# Patient Record
Sex: FEMALE | Race: BLACK OR AFRICAN AMERICAN | Employment: STUDENT | URBAN - METROPOLITAN AREA
[De-identification: names, ages, dates, MRNs, and addresses within clinical notes are randomized per-mention and may not be internally consistent; named-entity substitution may affect disease eponyms.]

---

## 2017-01-02 ENCOUNTER — ANESTHESIA (OUTPATIENT)
Dept: SURGERY | Age: 22
End: 2017-01-02
Payer: COMMERCIAL

## 2017-01-02 ENCOUNTER — HOSPITAL ENCOUNTER (EMERGENCY)
Age: 22
Discharge: HOME | End: 2017-01-02
Attending: EMERGENCY MEDICINE | Admitting: SURGERY
Payer: COMMERCIAL

## 2017-01-02 ENCOUNTER — ANESTHESIA EVENT (OUTPATIENT)
Dept: SURGERY | Age: 22
End: 2017-01-02
Payer: COMMERCIAL

## 2017-01-02 VITALS
OXYGEN SATURATION: 97 % | HEIGHT: 68 IN | BODY MASS INDEX: 35.92 KG/M2 | SYSTOLIC BLOOD PRESSURE: 140 MMHG | RESPIRATION RATE: 15 BRPM | TEMPERATURE: 98.2 F | WEIGHT: 237 LBS | DIASTOLIC BLOOD PRESSURE: 79 MMHG | HEART RATE: 74 BPM

## 2017-01-02 DIAGNOSIS — Z22.322 MRSA (METHICILLIN RESISTANT STAPH AUREUS) CULTURE POSITIVE: Primary | ICD-10-CM

## 2017-01-02 PROBLEM — L02.419 AXILLARY ABSCESS: Status: ACTIVE | Noted: 2017-01-02

## 2017-01-02 LAB
ANION GAP BLD CALC-SCNC: 14 MMOL/L (ref 5–15)
BUN BLD-MCNC: 9 MG/DL (ref 9–20)
CA-I BLD-MCNC: 1.13 MMOL/L (ref 1.12–1.32)
CHLORIDE BLD-SCNC: 105 MMOL/L (ref 98–107)
CO2 BLD-SCNC: 27 MMOL/L (ref 21–32)
CREAT BLD-MCNC: 0.6 MG/DL (ref 0.6–1.3)
GLUCOSE BLD-MCNC: 93 MG/DL (ref 65–105)
HCT VFR BLD CALC: 39 % (ref 35–47)
HGB BLD-MCNC: 13.3 GM/DL (ref 11.5–16)
POTASSIUM BLD-SCNC: 5.4 MMOL/L (ref 3.5–5.1)
SERVICE CMNT-IMP: ABNORMAL
SODIUM BLD-SCNC: 140 MMOL/L (ref 136–145)

## 2017-01-02 PROCEDURE — 77030008684 HC TU ET CUF COVD -B: Performed by: ANESTHESIOLOGY

## 2017-01-02 PROCEDURE — 76210000021 HC REC RM PH II 0.5 TO 1 HR: Performed by: SURGERY

## 2017-01-02 PROCEDURE — 77030026438 HC STYL ET INTUB CARD -A: Performed by: ANESTHESIOLOGY

## 2017-01-02 PROCEDURE — 74011250636 HC RX REV CODE- 250/636

## 2017-01-02 PROCEDURE — 87077 CULTURE AEROBIC IDENTIFY: CPT | Performed by: SURGERY

## 2017-01-02 PROCEDURE — 74011250636 HC RX REV CODE- 250/636: Performed by: EMERGENCY MEDICINE

## 2017-01-02 PROCEDURE — 77030018836 HC SOL IRR NACL ICUM -A: Performed by: SURGERY

## 2017-01-02 PROCEDURE — 74011250637 HC RX REV CODE- 250/637: Performed by: ANESTHESIOLOGY

## 2017-01-02 PROCEDURE — 80047 BASIC METABLC PNL IONIZED CA: CPT

## 2017-01-02 PROCEDURE — 96376 TX/PRO/DX INJ SAME DRUG ADON: CPT

## 2017-01-02 PROCEDURE — 76010000138 HC OR TIME 0.5 TO 1 HR: Performed by: SURGERY

## 2017-01-02 PROCEDURE — 99284 EMERGENCY DEPT VISIT MOD MDM: CPT

## 2017-01-02 PROCEDURE — 74011250636 HC RX REV CODE- 250/636: Performed by: ANESTHESIOLOGY

## 2017-01-02 PROCEDURE — 87186 SC STD MICRODIL/AGAR DIL: CPT | Performed by: SURGERY

## 2017-01-02 PROCEDURE — 77030011640 HC PAD GRND REM COVD -A: Performed by: SURGERY

## 2017-01-02 PROCEDURE — 87205 SMEAR GRAM STAIN: CPT | Performed by: SURGERY

## 2017-01-02 PROCEDURE — 76210000006 HC OR PH I REC 0.5 TO 1 HR: Performed by: SURGERY

## 2017-01-02 PROCEDURE — 76060000032 HC ANESTHESIA 0.5 TO 1 HR: Performed by: SURGERY

## 2017-01-02 PROCEDURE — 74011000250 HC RX REV CODE- 250

## 2017-01-02 PROCEDURE — 96374 THER/PROPH/DIAG INJ IV PUSH: CPT

## 2017-01-02 PROCEDURE — 96375 TX/PRO/DX INJ NEW DRUG ADDON: CPT

## 2017-01-02 PROCEDURE — 87075 CULTR BACTERIA EXCEPT BLOOD: CPT | Performed by: SURGERY

## 2017-01-02 RX ORDER — FENTANYL CITRATE 50 UG/ML
50 INJECTION, SOLUTION INTRAMUSCULAR; INTRAVENOUS AS NEEDED
Status: DISCONTINUED | OUTPATIENT
Start: 2017-01-02 | End: 2017-01-02 | Stop reason: HOSPADM

## 2017-01-02 RX ORDER — ONDANSETRON 2 MG/ML
INJECTION INTRAMUSCULAR; INTRAVENOUS AS NEEDED
Status: DISCONTINUED | OUTPATIENT
Start: 2017-01-02 | End: 2017-01-02 | Stop reason: HOSPADM

## 2017-01-02 RX ORDER — LIDOCAINE HYDROCHLORIDE 20 MG/ML
INJECTION, SOLUTION EPIDURAL; INFILTRATION; INTRACAUDAL; PERINEURAL AS NEEDED
Status: DISCONTINUED | OUTPATIENT
Start: 2017-01-02 | End: 2017-01-02 | Stop reason: HOSPADM

## 2017-01-02 RX ORDER — FENTANYL CITRATE 50 UG/ML
INJECTION, SOLUTION INTRAMUSCULAR; INTRAVENOUS AS NEEDED
Status: DISCONTINUED | OUTPATIENT
Start: 2017-01-02 | End: 2017-01-02 | Stop reason: HOSPADM

## 2017-01-02 RX ORDER — MIDAZOLAM HYDROCHLORIDE 1 MG/ML
1 INJECTION, SOLUTION INTRAMUSCULAR; INTRAVENOUS AS NEEDED
Status: DISCONTINUED | OUTPATIENT
Start: 2017-01-02 | End: 2017-01-02 | Stop reason: HOSPADM

## 2017-01-02 RX ORDER — MIDAZOLAM HYDROCHLORIDE 1 MG/ML
0.5 INJECTION, SOLUTION INTRAMUSCULAR; INTRAVENOUS
Status: DISCONTINUED | OUTPATIENT
Start: 2017-01-02 | End: 2017-01-03 | Stop reason: HOSPADM

## 2017-01-02 RX ORDER — SODIUM CHLORIDE, SODIUM LACTATE, POTASSIUM CHLORIDE, CALCIUM CHLORIDE 600; 310; 30; 20 MG/100ML; MG/100ML; MG/100ML; MG/100ML
125 INJECTION, SOLUTION INTRAVENOUS CONTINUOUS
Status: DISCONTINUED | OUTPATIENT
Start: 2017-01-02 | End: 2017-01-02 | Stop reason: HOSPADM

## 2017-01-02 RX ORDER — CABERGOLINE 0.5 MG/1
0.25 TABLET ORAL 2 TIMES WEEKLY
Status: ON HOLD | COMMUNITY
End: 2020-10-28

## 2017-01-02 RX ORDER — MIDAZOLAM HYDROCHLORIDE 1 MG/ML
INJECTION, SOLUTION INTRAMUSCULAR; INTRAVENOUS AS NEEDED
Status: DISCONTINUED | OUTPATIENT
Start: 2017-01-02 | End: 2017-01-02 | Stop reason: HOSPADM

## 2017-01-02 RX ORDER — SODIUM CHLORIDE 9 MG/ML
25 INJECTION, SOLUTION INTRAVENOUS CONTINUOUS
Status: DISCONTINUED | OUTPATIENT
Start: 2017-01-02 | End: 2017-01-03 | Stop reason: HOSPADM

## 2017-01-02 RX ORDER — HYDROCODONE BITARTRATE AND ACETAMINOPHEN 5; 325 MG/1; MG/1
1 TABLET ORAL
Qty: 30 TAB | Refills: 0 | Status: SHIPPED | OUTPATIENT
Start: 2017-01-02 | End: 2017-01-06

## 2017-01-02 RX ORDER — HYDROMORPHONE HYDROCHLORIDE 1 MG/ML
0.2 INJECTION, SOLUTION INTRAMUSCULAR; INTRAVENOUS; SUBCUTANEOUS
Status: DISCONTINUED | OUTPATIENT
Start: 2017-01-02 | End: 2017-01-03 | Stop reason: HOSPADM

## 2017-01-02 RX ORDER — HYDROCODONE BITARTRATE AND ACETAMINOPHEN 5; 325 MG/1; MG/1
1 TABLET ORAL AS NEEDED
Status: DISCONTINUED | OUTPATIENT
Start: 2017-01-02 | End: 2017-01-03 | Stop reason: HOSPADM

## 2017-01-02 RX ORDER — KETOROLAC TROMETHAMINE 30 MG/ML
INJECTION, SOLUTION INTRAMUSCULAR; INTRAVENOUS AS NEEDED
Status: DISCONTINUED | OUTPATIENT
Start: 2017-01-02 | End: 2017-01-02 | Stop reason: HOSPADM

## 2017-01-02 RX ORDER — MORPHINE SULFATE 10 MG/ML
2 INJECTION, SOLUTION INTRAMUSCULAR; INTRAVENOUS
Status: DISCONTINUED | OUTPATIENT
Start: 2017-01-02 | End: 2017-01-03 | Stop reason: HOSPADM

## 2017-01-02 RX ORDER — SUCCINYLCHOLINE CHLORIDE 20 MG/ML
INJECTION INTRAMUSCULAR; INTRAVENOUS AS NEEDED
Status: DISCONTINUED | OUTPATIENT
Start: 2017-01-02 | End: 2017-01-02 | Stop reason: HOSPADM

## 2017-01-02 RX ORDER — DEXAMETHASONE SODIUM PHOSPHATE 4 MG/ML
INJECTION, SOLUTION INTRA-ARTICULAR; INTRALESIONAL; INTRAMUSCULAR; INTRAVENOUS; SOFT TISSUE AS NEEDED
Status: DISCONTINUED | OUTPATIENT
Start: 2017-01-02 | End: 2017-01-02 | Stop reason: HOSPADM

## 2017-01-02 RX ORDER — SODIUM CHLORIDE 0.9 % (FLUSH) 0.9 %
5-10 SYRINGE (ML) INJECTION EVERY 8 HOURS
Status: DISCONTINUED | OUTPATIENT
Start: 2017-01-02 | End: 2017-01-02 | Stop reason: HOSPADM

## 2017-01-02 RX ORDER — NAPROXEN 500 MG/1
500 TABLET ORAL 2 TIMES DAILY WITH MEALS
Qty: 20 TAB | Refills: 0 | Status: SHIPPED | OUTPATIENT
Start: 2017-01-02 | End: 2017-01-07

## 2017-01-02 RX ORDER — DIPHENHYDRAMINE HYDROCHLORIDE 50 MG/ML
12.5 INJECTION, SOLUTION INTRAMUSCULAR; INTRAVENOUS AS NEEDED
Status: DISCONTINUED | OUTPATIENT
Start: 2017-01-02 | End: 2017-01-03 | Stop reason: HOSPADM

## 2017-01-02 RX ORDER — HYDROMORPHONE HYDROCHLORIDE 1 MG/ML
1 INJECTION, SOLUTION INTRAMUSCULAR; INTRAVENOUS; SUBCUTANEOUS
Status: COMPLETED | OUTPATIENT
Start: 2017-01-02 | End: 2017-01-02

## 2017-01-02 RX ORDER — ONDANSETRON 2 MG/ML
4 INJECTION INTRAMUSCULAR; INTRAVENOUS AS NEEDED
Status: DISCONTINUED | OUTPATIENT
Start: 2017-01-02 | End: 2017-01-03 | Stop reason: HOSPADM

## 2017-01-02 RX ORDER — FENTANYL CITRATE 50 UG/ML
25 INJECTION, SOLUTION INTRAMUSCULAR; INTRAVENOUS
Status: DISCONTINUED | OUTPATIENT
Start: 2017-01-02 | End: 2017-01-03 | Stop reason: HOSPADM

## 2017-01-02 RX ORDER — SODIUM CHLORIDE 0.9 % (FLUSH) 0.9 %
5-10 SYRINGE (ML) INJECTION AS NEEDED
Status: DISCONTINUED | OUTPATIENT
Start: 2017-01-02 | End: 2017-01-03 | Stop reason: HOSPADM

## 2017-01-02 RX ORDER — LIDOCAINE HYDROCHLORIDE 10 MG/ML
0.1 INJECTION, SOLUTION EPIDURAL; INFILTRATION; INTRACAUDAL; PERINEURAL AS NEEDED
Status: DISCONTINUED | OUTPATIENT
Start: 2017-01-02 | End: 2017-01-02 | Stop reason: HOSPADM

## 2017-01-02 RX ORDER — SODIUM CHLORIDE 0.9 % (FLUSH) 0.9 %
5-10 SYRINGE (ML) INJECTION AS NEEDED
Status: DISCONTINUED | OUTPATIENT
Start: 2017-01-02 | End: 2017-01-02 | Stop reason: HOSPADM

## 2017-01-02 RX ORDER — SODIUM CHLORIDE, SODIUM LACTATE, POTASSIUM CHLORIDE, CALCIUM CHLORIDE 600; 310; 30; 20 MG/100ML; MG/100ML; MG/100ML; MG/100ML
75 INJECTION, SOLUTION INTRAVENOUS CONTINUOUS
Status: DISCONTINUED | OUTPATIENT
Start: 2017-01-02 | End: 2017-01-03 | Stop reason: HOSPADM

## 2017-01-02 RX ORDER — SODIUM CHLORIDE, SODIUM LACTATE, POTASSIUM CHLORIDE, CALCIUM CHLORIDE 600; 310; 30; 20 MG/100ML; MG/100ML; MG/100ML; MG/100ML
INJECTION, SOLUTION INTRAVENOUS
Status: DISCONTINUED | OUTPATIENT
Start: 2017-01-02 | End: 2017-01-02 | Stop reason: HOSPADM

## 2017-01-02 RX ORDER — ROCURONIUM BROMIDE 10 MG/ML
INJECTION, SOLUTION INTRAVENOUS AS NEEDED
Status: DISCONTINUED | OUTPATIENT
Start: 2017-01-02 | End: 2017-01-02 | Stop reason: HOSPADM

## 2017-01-02 RX ORDER — SODIUM CHLORIDE 9 MG/ML
50 INJECTION, SOLUTION INTRAVENOUS CONTINUOUS
Status: DISCONTINUED | OUTPATIENT
Start: 2017-01-02 | End: 2017-01-02 | Stop reason: HOSPADM

## 2017-01-02 RX ORDER — ONDANSETRON 2 MG/ML
4 INJECTION INTRAMUSCULAR; INTRAVENOUS
Status: COMPLETED | OUTPATIENT
Start: 2017-01-02 | End: 2017-01-02

## 2017-01-02 RX ORDER — HYDROMORPHONE HYDROCHLORIDE 1 MG/ML
1 INJECTION, SOLUTION INTRAMUSCULAR; INTRAVENOUS; SUBCUTANEOUS
Status: DISCONTINUED | OUTPATIENT
Start: 2017-01-02 | End: 2017-01-03 | Stop reason: HOSPADM

## 2017-01-02 RX ORDER — HYDROMORPHONE HYDROCHLORIDE 1 MG/ML
INJECTION, SOLUTION INTRAMUSCULAR; INTRAVENOUS; SUBCUTANEOUS AS NEEDED
Status: DISCONTINUED | OUTPATIENT
Start: 2017-01-02 | End: 2017-01-02 | Stop reason: HOSPADM

## 2017-01-02 RX ORDER — PROPOFOL 10 MG/ML
INJECTION, EMULSION INTRAVENOUS AS NEEDED
Status: DISCONTINUED | OUTPATIENT
Start: 2017-01-02 | End: 2017-01-02 | Stop reason: HOSPADM

## 2017-01-02 RX ADMIN — HYDROMORPHONE HYDROCHLORIDE 1 MG: 1 INJECTION, SOLUTION INTRAMUSCULAR; INTRAVENOUS; SUBCUTANEOUS at 12:07

## 2017-01-02 RX ADMIN — SODIUM CHLORIDE, SODIUM LACTATE, POTASSIUM CHLORIDE, CALCIUM CHLORIDE: 600; 310; 30; 20 INJECTION, SOLUTION INTRAVENOUS at 21:33

## 2017-01-02 RX ADMIN — FENTANYL CITRATE 50 MCG: 50 INJECTION, SOLUTION INTRAMUSCULAR; INTRAVENOUS at 21:38

## 2017-01-02 RX ADMIN — DEXAMETHASONE SODIUM PHOSPHATE 8 MG: 4 INJECTION, SOLUTION INTRA-ARTICULAR; INTRALESIONAL; INTRAMUSCULAR; INTRAVENOUS; SOFT TISSUE at 21:47

## 2017-01-02 RX ADMIN — ONDANSETRON 4 MG: 2 INJECTION INTRAMUSCULAR; INTRAVENOUS at 12:07

## 2017-01-02 RX ADMIN — SUCCINYLCHOLINE CHLORIDE 140 MG: 20 INJECTION INTRAMUSCULAR; INTRAVENOUS at 21:38

## 2017-01-02 RX ADMIN — MIDAZOLAM HYDROCHLORIDE 3 MG: 1 INJECTION, SOLUTION INTRAMUSCULAR; INTRAVENOUS at 21:37

## 2017-01-02 RX ADMIN — FENTANYL CITRATE 50 MCG: 50 INJECTION, SOLUTION INTRAMUSCULAR; INTRAVENOUS at 21:55

## 2017-01-02 RX ADMIN — KETOROLAC TROMETHAMINE 30 MG: 30 INJECTION, SOLUTION INTRAMUSCULAR; INTRAVENOUS at 22:16

## 2017-01-02 RX ADMIN — HYDROMORPHONE HYDROCHLORIDE 1 MG: 1 INJECTION, SOLUTION INTRAMUSCULAR; INTRAVENOUS; SUBCUTANEOUS at 15:30

## 2017-01-02 RX ADMIN — ONDANSETRON 4 MG: 2 INJECTION INTRAMUSCULAR; INTRAVENOUS at 22:49

## 2017-01-02 RX ADMIN — PROPOFOL 200 MG: 10 INJECTION, EMULSION INTRAVENOUS at 21:38

## 2017-01-02 RX ADMIN — HYDROMORPHONE HYDROCHLORIDE 0.25 MG: 1 INJECTION, SOLUTION INTRAMUSCULAR; INTRAVENOUS; SUBCUTANEOUS at 21:50

## 2017-01-02 RX ADMIN — HYDROMORPHONE HYDROCHLORIDE 1 MG: 1 INJECTION, SOLUTION INTRAMUSCULAR; INTRAVENOUS; SUBCUTANEOUS at 19:38

## 2017-01-02 RX ADMIN — ONDANSETRON 4 MG: 2 INJECTION INTRAMUSCULAR; INTRAVENOUS at 21:47

## 2017-01-02 RX ADMIN — HYDROCODONE BITARTRATE AND ACETAMINOPHEN 1 TABLET: 5; 325 TABLET ORAL at 22:49

## 2017-01-02 RX ADMIN — ROCURONIUM BROMIDE 5 MG: 10 INJECTION, SOLUTION INTRAVENOUS at 21:38

## 2017-01-02 RX ADMIN — LIDOCAINE HYDROCHLORIDE 60 MG: 20 INJECTION, SOLUTION EPIDURAL; INFILTRATION; INTRACAUDAL; PERINEURAL at 21:38

## 2017-01-02 RX ADMIN — MIDAZOLAM HYDROCHLORIDE 2 MG: 1 INJECTION, SOLUTION INTRAMUSCULAR; INTRAVENOUS at 21:33

## 2017-01-02 NOTE — IP AVS SNAPSHOT
[facility] [address] [place] [address] 
[phone] Patient: Heber Cotter MRN: [id number] AEW:[id number] You are allergic to the following No active allergies Recent Documentation Height Weight BMI OB Status Smoking Status 1.727 m 107.5 kg 36.04 kg/m2 Having regular periods Passive Smoke Exposure - Never Smoker Unresulted Labs Order Current Status CULTURE, ANAEROBIC In process CULTURE, WOUND W GRAM STAIN In process Emergency Contacts Name Discharge Info Relation Home Work Mobile Monik Forde  Parent [1] [phone] About your hospitalization You were admitted on:  N/A You last received care in the:  [facility] PACU You were discharged on:  January 2, 2017 Unit phone number:  [phone] Why you were hospitalized Your primary diagnosis was:  Axillary Abscess Providers Seen During Your Hospitalizations Provider Role Specialty Primary office phone Gera Arevalo MD Attending Provider Emergency Medicine [phone] Shasta Kelley MD Attending Provider General Surgery [phone] Your Primary Care Physician (PCP) Primary Care Physician Office Phone Office Fax Viviana Ramos [phone] Follow-up Information Follow up With Details Comments Contact Info Diana Pina, [address] [place] [address] 
[address] [employer] [place] [address] 
[phone] Current Discharge Medication List  
  
START taking these medications Dose & Instructions Dispensing Information Comments Morning Noon Evening Bedtime HYDROcodone-acetaminophen 5-325 mg per tablet Commonly known as:  Caity Gilliam Your next dose is: Today, Tomorrow Other:  _________ Dose:  1 Tab Take 1 Tab by mouth every six (6) hours as needed for Pain (may take two tabs if pain is severe). Max Daily Amount: 4 Tabs. Quantity:  30 Tab Refills:  0  
     
   
   
   
  
 naproxen 500 mg tablet Commonly known as:  NAPROSYN Your next dose is: Today, Tomorrow Other:  _________ Dose:  500 mg Take 1 Tab by mouth two (2) times daily (with meals) for 5 days. Quantity:  20 Tab Refills:  0 CONTINUE these medications which have NOT CHANGED Dose & Instructions Dispensing Information Comments Morning Noon Evening Bedtime  
 cabergoline 0.5 mg tablet Commonly known as:  DOSTINEX Your next dose is: Today, Tomorrow Other:  _________ Dose:  0.25 mg Take 0.25 mg by mouth two (2) times a week. Refills:  0 Where to Get Your Medications Information on where to get these meds will be given to you by the nurse or doctor. ! Ask your nurse or doctor about these medications HYDROcodone-acetaminophen 5-325 mg per tablet  
 naproxen 500 mg tablet Discharge Instructions Patient Discharge Instructions Luisa Chávez / [id number] : 1995 Admitted 2017 Discharged: 2017 · It is important that you take the medication exactly as they are prescribed. · Keep your medication in the bottles provided by the pharmacist and keep a list of the medication names, dosages, and times to be taken in your wallet. · Do not take other medications without consulting your doctor. Take naproxen (Anaprox, Naprosyn, Naprelan) twice daily as scheduled (do not wait for pain) then combine with hydrocodone/acetaminophen (Lorcet, Lortab, Maxidone, Norco, Vicodin, Xodol, Zydone) as directed for severe pain. What to do at [facility] Recommended diet: Regular Diet Recommended activity: no heavy lifting or strenuous activity with left arm If you experience any of the following symptoms below, call Dr. Pham Esposito office [phone]. Follow-up with Dr. Wilma Peña in 1 week(s). Call the office to schedule your appointment. Contact the [address] to schedule wound care. [phone] Information obtained by : 
I understand that if any problems occur once I am at home I am to contact my physician. I understand and acknowledge receipt of the instructions indicated above. Physician's or R.N.'s Signature                                                                  Date/Time Patient or Representative Signature                                                          Date/Time Wound Care: After Your Visit Your Care Instructions Taking good care of your wound at home will help it heal quickly and reduce your chance of infection. Follow-up care is a key part of your treatment and safety. Be sure to make and go to all appointments, and call your doctor if you are having problems. It's also a good idea to know your test results and keep a list of the medicines you take. How can you care for yourself at home? · Remove the dressings then clean the area with soap and water once daily (in shower is okay). It's okay to get the inside of the wound wet in the shower. Other cleaning products, such as hydrogen peroxide, can delay wound healing. ¨ Pack wound with saline-moistened gauze. ¨ Cover with dry gauze and ABD pad. · Take pain medicines exactly as directed. Some pain is normal with a wound, but do not ignore pain that is getting worse instead of better. You could have an infection. ¨ If the doctor gave you a prescription medicine for pain, take it as prescribed. ¨ If you are not taking a prescription pain medicine, ask your doctor if you can take an over-the-counter medicine. ¨ Do not take two or more pain medicines at the same time unless the doctor told you to. Many pain medicines have acetaminophen, which is Tylenol. Too much acetaminophen can be harmful. ¨ Do not give aspirin to anyone younger than 20. It has been linked to Reye syndrome, a serious illness. When should you call for help? Call your doctor now or seek immediate medical care if: 
· You have signs of infection, such as: 
¨ Increased pain, swelling, warmth, or redness around the wound. ¨ Red streaks leading from the wound. ¨ Pus draining from the wound. ¨ Swollen lymph nodes in your neck, armpits, or groin. ¨ A fever. · The wound starts to bleed, and blood soaks through the bandage. Oozing small amounts of blood is normal. 
Watch closely for changes in your health, and be sure to contact your doctor if: · The wound is not getting better each day. Where can you learn more? Go to [employer].be. Enter Z729 in the search box to learn more about \"Wound Care: After Your Visit. \"  
© 8364-8736 Healthwise, Incorporated. Care instructions adapted under license by Romayne Duster (which disclaims liability or warranty for this information). This care instruction is for use with your licensed healthcare professional. If you have questions about a medical condition or this instruction, always ask your healthcare professional. Irena Fee any warranty or liability for your use of this information. Discharge Orders None Introducing [address]! Romayne Duster introduces [employer] patient portal. Now you can access parts of your medical record, email your doctor's office, and request medication refills online. 1. In your internet browser, go to [email or web address] 2. Click on the First Time User? Click Here link in the Sign In box.  You will see the New Member Sign Up page. 3. Enter your Thinkspeed Access Code exactly as it appears below. You will not need to use this code after youve completed the sign-up process. If you do not sign up before the expiration date, you must request a new code. · Thinkspeed Access Code: [email or web address] Expires: 4/2/2017  8:45 AM 
 
4. Enter the last four digits of your Social Security Number (xxxx) and Date of Birth (mm/dd/yyyy) as indicated and click Submit. You will be taken to the next sign-up page. 5. Create a Thinkspeed ID. This will be your Thinkspeed login ID and cannot be changed, so think of one that is secure and easy to remember. 6. Create a Thinkspeed password. You can change your password at any time. 7. Enter your Password Reset Question and Answer. This can be used at a later time if you forget your password. 8. Enter your e-mail address. You will receive e-mail notification when new information is available in [address] 9. Click Sign Up. You can now view and download portions of your medical record. 10. Click the Download Summary menu link to download a portable copy of your medical information. If you have questions, please visit the Frequently Asked Questions section of the Thinkspeed website. Remember, Thinkspeed is NOT to be used for urgent needs. For medical emergencies, dial 911. Now available from your iPhone and Android! General Information Please provide this summary of care documentation to your next provider. Patient Signature:  ____________________________________________________________ Date:  ____________________________________________________________  
  
[employer] Provider Signature:  ____________________________________________________________ Date:  ____________________________________________________________

## 2017-01-02 NOTE — CONSULTS
Surgery Consultation    History of Present Illness:      Clinton Holt is a 24 y.o. female who presents with a boil at left underarm. First noticed about 1.5 weeks ago and has grown in size since then. She recalls having similar lesion on her back but this is first episode in axilla. Pt is referred by Lucero Gonzalez MD.    Consultation was requested for assistance with evaluation of axillary abscess. Past Medical History   Diagnosis Date    Acne      back    Allergy, unspecified not elsewhere classified 2006     Txd with Immunotherapy. Dr. Papi Glass     Past Surgical History   Procedure Laterality Date    Hx tympanostomy  1999     Rt      Family History   Problem Relation Age of Onset    Asthma Mother     High Cholesterol Mother     Diabetes Paternal Grandmother     Arthritis-osteo Maternal Grandmother      Social History     Social History    Marital status: SINGLE     Spouse name: N/A    Number of children: N/A    Years of education: N/A     Social History Main Topics    Smoking status: Passive Smoke Exposure - Never Smoker    Smokeless tobacco: Never Used      Comment: lived with smoker dad x 3 yrs    Alcohol use No    Drug use: No    Sexual activity: Yes     Partners: Male     Birth control/ protection: Condom     Other Topics Concern    None     Social History Narrative      No current facility-administered medications for this encounter. Current Outpatient Prescriptions   Medication Sig    cabergoline (DOSTINEX) 0.5 mg tablet Take 0.25 mg by mouth two (2) times a week.       No Known Allergies      Physical Exam:     Visit Vitals    /90 (BP 1 Location: Right arm, BP Patient Position: At rest)    Pulse 87    Temp 97.5 °F (36.4 °C)    Resp 14    Ht 5' 8\" (1.727 m)    Wt 237 lb (107.5 kg)    SpO2 98%    BMI 36.04 kg/m2        General:  alert, cooperative, no distress   Lungs:   clear to auscultation bilaterally   Heart:  Regular rate and rhythm   Extremities: Left axilla - subcutaneous fluctuant and tender mass, 5 cm x 3 cm x 2 cm   Skin: Normal.   Neuro: Mental status: Alert, oriented, thought content appropriate                 Assessment:     Left axillary abscess without cellulitis    Recommendations:     incision and drainage of abscess    I explained the indications for I & D as well as the alternatives. I discussed the potential risks, including but not limited to bleeding, wound infection, need for more surgery and also the role for healing by secondary intention. I answered her questions without making any guarantees. She indicates that she understands the risks, accepts and wishes to proceed.      Signed By: Marbin Conley    January 2, 2017        Cc: Paz Montero MD

## 2017-01-02 NOTE — PROGRESS NOTES
Admission Medication Reconciliation:    Information obtained from: Patient, Rx Query    Significant PMH/Disease States:   Past Medical History   Diagnosis Date    Acne      back    Allergy, unspecified not elsewhere classified 2006     Txd with Immunotherapy. Dr. Kelly Morales       Chief Complaint for this Admission:    Chief Complaint   Patient presents with    Abscess         Allergies:  Review of patient's allergies indicates no known allergies. Prior to Admission Medications:   Prior to Admission Medications   Prescriptions Last Dose Informant Patient Reported? Taking?   cabergoline (DOSTINEX) 0.5 mg tablet 12/30/2016  Yes Yes   Sig: Take 0.25 mg by mouth two (2) times a week. Facility-Administered Medications: None         Comments/Recommendations: Removed all previous entries from file. Added cabergoline. Confirmed [facility] and preferred pharmacy.     Natalee Alexandre, PharmD

## 2017-01-02 NOTE — ED PROVIDER NOTES
HPI Comments: 24 y.o. female with no significant past medical history who presents from home with chief complaint of abcess. Patient arrives today complaining of an abscess to her left axilla that first was noticed 1.5 weeks ago and has gotten progressively bigger. She reports fever but denies chills. She denies change in appetite. 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 She states she was pre-diabetic in the past but is not now. There are no other acute medical concerns at this time. PCP: Diana Pina DO    Note written by julito Jon, as dictated by Gera Arevalo MD 9:43 AM      The history is provided by the patient. Past Medical History:   Diagnosis Date    Acne      back    Allergy, unspecified not elsewhere classified 2006     Txd with Immunotherapy. Dr. Keily Nava       Past Surgical History:   Procedure Laterality Date    Hx tympanostomy  1999     Rt         Family History:   Problem Relation Age of Onset    Asthma Mother     High Cholesterol Mother     Diabetes Paternal Grandmother     Arthritis-osteo Maternal Grandmother        Social History     Social History    Marital status: SINGLE     Spouse name: N/A    Number of children: N/A    Years of education: N/A     Occupational History    Not on file. Social History Main Topics    Smoking status: Passive Smoke Exposure - Never Smoker    Smokeless tobacco: Never Used      Comment: lived with smoker dad x 3 yrs    Alcohol use No    Drug use: No    Sexual activity: Yes     Partners: Male     Birth control/ protection: Condom     Other Topics Concern    Not on file     Social History Narrative         ALLERGIES: Review of patient's allergies indicates no known allergies. Review of Systems   Constitutional: Negative for appetite change, chills and fever. HENT: Negative for congestion. Eyes: Negative for visual disturbance.    Respiratory: Negative for cough, chest tightness, shortness of breath and wheezing. Cardiovascular: Negative for chest pain. Gastrointestinal: Negative for abdominal pain, diarrhea and vomiting. Genitourinary: Negative for dysuria and frequency. Musculoskeletal: Negative for joint swelling. Skin: Negative for rash. Abscess   Neurological: Negative for speech difficulty and headaches. All other systems reviewed and are negative. Vitals:    01/02/17 0840   BP: 128/90   Pulse: 87   Resp: 14   Temp: 97.5 °F (36.4 °C)   SpO2: 98%   Weight: 107.5 kg (237 lb)   Height: 5' 8\" (1.727 m)            Physical Exam   Constitutional: She is oriented to person, place, and time. She appears well-developed and well-nourished. No distress. HENT:   Head: Normocephalic and atraumatic. Nose: Nose normal.   Eyes: Conjunctivae are normal. Pupils are equal, round, and reactive to light. No scleral icterus. Neck: Normal range of motion. Neck supple. No JVD present. No tracheal deviation present. No thyromegaly present. Cardiovascular: Normal rate, regular rhythm and normal heart sounds. No murmur heard. Pulmonary/Chest: Effort normal and breath sounds normal. No respiratory distress. She has no wheezes. She has no rales. Abdominal: Soft. Bowel sounds are normal. She exhibits no mass. There is no tenderness. There is no rebound and no guarding. Musculoskeletal: Normal range of motion. She exhibits no edema. Neurological: She is alert and oriented to person, place, and time. No cranial nerve deficit. Coordination normal.   Skin: Skin is warm and dry. She is not diaphoretic. See photo of left axilla   Psychiatric: She has a normal mood and affect. Her behavior is normal.   Nursing note and vitals reviewed. Note written by julito Triana, as dictated by Yuliana Valle MD 9:43 AM      [facility]  ED Course       Procedures          CONSULT NOTE:  11:13 AM Yuliana Valle MD spoke with Dr. Hilda Wright, Consult for Surgery.   Discussed available diagnostic tests and clinical findings. He will take the patient to the OR.

## 2017-01-03 ENCOUNTER — TELEPHONE (OUTPATIENT)
Dept: SURGERY | Age: 22
End: 2017-01-03

## 2017-01-03 NOTE — ED NOTES
OR transport has arrived for pt. Pt remains awake and alert with skin warm and dry, respirations even and unlabored. Skin warm and dry. Pt transferred via wheelchair.

## 2017-01-03 NOTE — BRIEF OP NOTE
BRIEF OPERATIVE NOTE    Date of Procedure: 1/2/2017   Preoperative Diagnosis: left axillary abscess  Postoperative Diagnosis: left axillary abscess    Procedure(s):  INCISION AND DRAINAGE OF LEFT AXILLAARY ABSCESS  Surgeon(s) and Role:     * Renny Zhao MD - Primary            Surgical Staff:  Circ-1: Eve Corley RN  Scrub Tech-1: Rolla Gosselin  Event Time In   Incision Start 2159   Incision Close 2215     Anesthesia: General   Estimated Blood Loss: 20 cc IV:  cc  Specimens:   ID Type Source Tests Collected by Time Destination   1 : Left Axilla Abscess Body Fluid Axilla AEROBIC/ANAEROBIC Victor Manuel Snowden MD 1/2/2017 2200 Microbiology      Findings: axillary abscess, wound dimensions - L 5.5 cm x W 1 cm x D 2 cm   Complications: none  Implants: * No implants in log *

## 2017-01-03 NOTE — DISCHARGE INSTRUCTIONS
Patient Discharge Instructions    [facility] / [id number] : 1995    Admitted 2017 Discharged: 2017       · It is important that you take the medication exactly as they are prescribed. · Keep your medication in the bottles provided by the pharmacist and keep a list of the medication names, dosages, and times to be taken in your wallet. · Do not take other medications without consulting your doctor. Take naproxen (Anaprox, Naprosyn, Naprelan) twice daily as scheduled (do not wait for pain) then combine with hydrocodone/acetaminophen (Lorcet, Lortab, Maxidone, Norco, Vicodin, Xodol, Zydone) as directed for severe pain. What to do at Home    Recommended diet: Regular Diet    Recommended activity: no heavy lifting or strenuous activity with left arm    If you experience any of the following symptoms below, call Dr. Pauly Hercules office [phone]. Follow-up with Dr. Phoenix Astorga in 1 week(s). Call the office to schedule your appointment. Contact the [facility]  to schedule wound care. [phone]        Information obtained by :  I understand that if any problems occur once I am at home I am to contact my physician. I understand and acknowledge receipt of the instructions indicated above. Physician's or R.N.'s Signature                                                                  Date/Time                                                                                                                                              Patient or Representative Signature                                                          Date/Time     Wound Care: After Your Visit  Your Care Instructions  Taking good care of your wound at home will help it heal quickly and reduce your chance of infection.   Follow-up care is a key part of your treatment and safety. Be sure to make and go to all appointments, and call your doctor if you are having problems. It's also a good idea to know your test results and keep a list of the medicines you take. How can you care for yourself at home? · Remove the dressings then clean the area with soap and water once daily (in shower is okay). It's okay to get the inside of the wound wet in the shower. Other cleaning products, such as hydrogen peroxide, can delay wound healing. ¨ Pack wound with saline-moistened gauze. ¨ Cover with dry gauze and ABD pad. · Take pain medicines exactly as directed. Some pain is normal with a wound, but do not ignore pain that is getting worse instead of better. You could have an infection. ¨ If the doctor gave you a prescription medicine for pain, take it as prescribed. ¨ If you are not taking a prescription pain medicine, ask your doctor if you can take an over-the-counter medicine. ¨ Do not take two or more pain medicines at the same time unless the doctor told you to. Many pain medicines have acetaminophen, which is Tylenol. Too much acetaminophen can be harmful. ¨ Do not give aspirin to anyone younger than 20. It has been linked to Reye syndrome, a serious illness. When should you call for help? Call your doctor now or seek immediate medical care if:  · You have signs of infection, such as:  ¨ Increased pain, swelling, warmth, or redness around the wound. ¨ Red streaks leading from the wound. ¨ Pus draining from the wound. ¨ Swollen lymph nodes in your neck, armpits, or groin. ¨ A fever. · The wound starts to bleed, and blood soaks through the bandage. Oozing small amounts of blood is normal.  Watch closely for changes in your health, and be sure to contact your doctor if:  · The wound is not getting better each day. Where can you learn more? Go to [employer]be.   Enter L077 in the search box to learn more about \"Wound Care: After Your Visit. \"   © 3013-9190 Healthwise, Incorporated. Care instructions adapted under license by Kim Luna (which disclaims liability or warranty for this information). This care instruction is for use with your licensed healthcare professional. If you have questions about a medical condition or this instruction, always ask your healthcare professional. Juan Galarza any warranty or liability for your use of this information.

## 2017-01-03 NOTE — ANESTHESIA POSTPROCEDURE EVALUATION
Post-Anesthesia Evaluation and Assessment    Patient: Radha Nguyen MRN: [id number]  SSN: [id number]    YOB: 1995  Age: 24 y.o. Sex: female       Cardiovascular Function/Vital Signs  Visit Vitals    BP (P) 140/79 (BP 1 Location: Right arm, BP Patient Position: At rest)    Pulse 74    Temp (P) 36.8 °C (98.2 °F)    Resp 15    Ht 5' 8\" (1.727 m)    Wt 107.5 kg (237 lb)    SpO2 97%    BMI 36.04 kg/m2       Patient is status post general anesthesia for Procedure(s):  INCISION AND DRAINAGE OF LEFT AXILLAARY ABSCESS. Nausea/Vomiting: None    Postoperative hydration reviewed and adequate. Pain:  Pain Scale 1: (P) Numeric (0 - 10) (01/02/17 2300)  Pain Intensity 1: (P) 0 (01/02/17 2300)   Managed    Neurological Status:   Neuro (WDL): (P) Within Defined Limits (01/02/17 2300)   At baseline    Mental Status and Level of Consciousness: Arousable    Pulmonary Status:   O2 Device: (P) Room air (01/02/17 2300)   Adequate oxygenation and airway patent    Complications related to anesthesia: None    Post-anesthesia assessment completed.  No concerns    Signed By: Mahsa Hill MD     January 3, 2017

## 2017-01-03 NOTE — OP NOTES
Operative Report    Date of Surgery: 1/2/2017     Preoperative Diagnosis: left axillary abscess    Postoperative Diagnosis:  left axillary abscess    Procedure(s): Incision and drainage of left axillary abscess    Surgeon: Dee Menard MD    Assistants: none    Anesthesia:  General     Indications: The patient presented to the hospital with left axillary abscess. The patient now presents for incision and drainage after discussing therapeutic alternatives. Findings:  Axillary abscess, L 5.5 cm x W 1 cm x D 2 cm, no clellulitis    Estimated Blood Loss: 20 cc IV:  cc           Drains: none                Specimens:   ID Type Source Tests Collected by Time Destination   1 : Left Axilla Abscess Body Fluid Axilla AEROBIC/ANAEROBIC CULTURE Dee Menard MD 1/2/2017 2200 Microbiology               Implants: * No implants in log *           Complications:  estela     Procedure in Detail: The patient was seen in the Holding Area. After obtaining informed consent the patient was taken to the operating room, identified as Deric Rader , and the procedure verified. A Time Out was held and the above information confirmed. The patient was placed supine position. After establishing general anesthesia, the left axilla was prepped and draped in sterile fashion. The abscess was incised with a scalpel and pus flowed. Cultures were taken. The excision was carried to expose the entire abscess cavity. Cautery was used for hemostasis. The wound was packed with saline-soaked Kenzie rolled gauze then covered with dry 4 x 4 and dressed with ABD pads. Instrument, sponge, and needle counts were correct prior to closure and at the conclusion of the case. The patient was extubated and transferred to PACU in stable condition. Disposition: PACU - hemodynamically stable.            Condition: stable    Signed By: Dee Menard MD                         January 2, 2017

## 2017-01-03 NOTE — ROUTINE PROCESS
Patient: Kezia Pisano MRN: [id number]  SSN: [id number]   YOB: 1995  Age: 24 y.o. Sex: female     Patient is status post Procedure(s):  INCISION AND DRAINAGE OF LEFT AXILLAARY ABSCESS.     Surgeon(s) and Role:     * Zoraida Kent MD - Primary    Angela Lucio:  Normal Saline                  Peripheral IV 01/02/17 Right Hand (Active)   Site Assessment Clean, dry, & intact 1/2/2017  8:40 PM   Phlebitis Assessment 0 1/2/2017  8:40 PM   Infiltration Assessment 0 1/2/2017  8:40 PM   Dressing Status Clean, dry, & intact 1/2/2017  8:40 PM   Dressing Type Tape 1/2/2017  8:40 PM   Hub Color/Line Status Capped 1/2/2017  8:40 PM                           Dressing/Packing:  Wound Axilla Left-DRESSING TYPE: Packing;4 x 4;Elastic bandage (01/02/17 2232):  ]

## 2017-01-03 NOTE — ED NOTES
Verbal shift change report given to Lou Nash RN (oncoming nurse) by Alexander Trevino RN (offgoing nurse). Report included the following information SBAR, ED Summary, MAR and Recent Results.

## 2017-01-03 NOTE — ANESTHESIA PREPROCEDURE EVALUATION
Anesthetic History   No history of anesthetic complications            Review of Systems / Medical History  Patient summary reviewed, nursing notes reviewed and pertinent labs reviewed    Pulmonary  Within defined limits                 Neuro/Psych   Within defined limits           Cardiovascular  Within defined limits                     GI/Hepatic/Renal  Within defined limits              Endo/Other  Within defined limits      Obesity     Other Findings              Physical Exam    Airway  Mallampati: II  TM Distance: > 6 cm  Neck ROM: normal range of motion   Mouth opening: Normal     Cardiovascular  Regular rate and rhythm,  S1 and S2 normal,  no murmur, click, rub, or gallop             Dental  No notable dental hx       Pulmonary  Breath sounds clear to auscultation               Abdominal  GI exam deferred       Other Findings            Anesthetic Plan    ASA: 2  Anesthesia type: general          Induction: Intravenous  Anesthetic plan and risks discussed with: Patient

## 2017-01-04 ENCOUNTER — TELEPHONE (OUTPATIENT)
Dept: SURGERY | Age: 22
End: 2017-01-04

## 2017-01-04 ENCOUNTER — HOSPITAL ENCOUNTER (OUTPATIENT)
Dept: INFUSION THERAPY | Age: 22
Discharge: HOME | End: 2017-01-04
Payer: COMMERCIAL

## 2017-01-04 PROCEDURE — 97602 WOUND(S) CARE NON-SELECTIVE: CPT

## 2017-01-04 NOTE — PROGRESS NOTES
Outpatient Infusion Center Short Visit Progress Note    3660 Pt admit to [facility] for wound care ambulatory in stable condition. Assessment completed. No new concerns voiced. Visit Vitals    BP (P) 119/62    Pulse (!) (P) 54    Temp (P) 97.8 °F (36.6 °C)    Resp (P) 18     WOUND POA CONDITIONS    Wound Arm Left (Active)   DRESSING STATUS Breakthrough drainage 1/4/2017  4:16 PM   DRESSING TYPE 4 x 4;Gauze; Other (Comment) 1/4/2017  4:16 PM   Drainage Amount  Moderate 1/4/2017  4:16 PM   Drainage Color Serosanguinous 1/4/2017  4:16 PM   Wound Odor Mild 1/4/2017  4:16 PM   Cleansing and Cleansing Agents  Normal saline 1/4/2017  4:16 PM   Dressing Type Applied 4 x 4;Wet to dry;Gauze 1/4/2017  4:16 PM   Procedure Tolerated Poor 1/4/2017  4:16 PM   Number of days:0             4050 Pt tolerated treatment well. D/c home ambulatory in no distress. Pt aware of next appointment scheduled for 1/5/17.

## 2017-01-05 ENCOUNTER — HOSPITAL ENCOUNTER (OUTPATIENT)
Dept: INFUSION THERAPY | Age: 22
Discharge: HOME | End: 2017-01-05
Payer: COMMERCIAL

## 2017-01-05 ENCOUNTER — TELEPHONE (OUTPATIENT)
Dept: SURGERY | Age: 22
End: 2017-01-05

## 2017-01-05 VITALS
SYSTOLIC BLOOD PRESSURE: 125 MMHG | RESPIRATION RATE: 18 BRPM | HEART RATE: 76 BPM | DIASTOLIC BLOOD PRESSURE: 94 MMHG | TEMPERATURE: 97.8 F

## 2017-01-05 LAB
BACTERIA SPEC CULT: NORMAL
SERVICE CMNT-IMP: NORMAL

## 2017-01-05 PROCEDURE — 97602 WOUND(S) CARE NON-SELECTIVE: CPT

## 2017-01-05 NOTE — TELEPHONE ENCOUNTER
positive for mrsa. Is having bleeding at site.   Chau Found will call patient and let her know she needs to be seen here tomorrow by in this office

## 2017-01-05 NOTE — PROGRESS NOTES
Outpatient Infusion Center Short Visit Progress Note    1302 Pt admit to [facility] for left axillary wound/dressing change ambulatory in stable condition. Assessment completed. Patient stated her arm feels like it is swollen from wound down to elbow on the left side. Wound is draining sanguinous fluid, dressing from previous day soaked with sanguinous fluid. Wound has odor but not warm to the touch and patient does not have a fever. Called MD office about excessive drainage, appointment made for patient at 0930 1/6/17. Called and notified patient, patient acknowledged appointment time. Visit Vitals    BP (!) 125/94 (BP 1 Location: Right arm, BP Patient Position: Sitting)    Pulse 76    Temp 97.8 °F (36.6 °C)    Resp 18     Wound irrigated with saline flushes, packed with 2x2 saline soaked evan, 4x4 and ABD applied to wound and wrapped with coban. Medications:  Saline flushes    1640 Pt tolerated treatment well. D/c home ambulatory in no distress. Pt aware of next appointment scheduled for 1/7/17 at 0900. Patient will be late to appointment because of MD appointment prior.

## 2017-01-05 NOTE — TELEPHONE ENCOUNTER
YASMIN Lepe @ [phone] with the Outpatient infusion center needs a call back to give update about pt

## 2017-01-06 ENCOUNTER — APPOINTMENT (OUTPATIENT)
Dept: INFUSION THERAPY | Age: 22
End: 2017-01-06
Payer: COMMERCIAL

## 2017-01-06 ENCOUNTER — OFFICE VISIT (OUTPATIENT)
Dept: SURGERY | Age: 22
End: 2017-01-06

## 2017-01-06 VITALS
DIASTOLIC BLOOD PRESSURE: 80 MMHG | HEIGHT: 68 IN | BODY MASS INDEX: 36.15 KG/M2 | RESPIRATION RATE: 18 BRPM | TEMPERATURE: 98.2 F | OXYGEN SATURATION: 98 % | HEART RATE: 78 BPM | WEIGHT: 238.5 LBS | SYSTOLIC BLOOD PRESSURE: 120 MMHG

## 2017-01-06 DIAGNOSIS — Z09 POSTOPERATIVE EXAMINATION: ICD-10-CM

## 2017-01-06 DIAGNOSIS — S41.102D WOUND, OPEN AXILLA, LEFT, SUBSEQUENT ENCOUNTER: ICD-10-CM

## 2017-01-06 DIAGNOSIS — L02.419 AXILLARY ABSCESS: Primary | ICD-10-CM

## 2017-01-06 LAB
BACTERIA SPEC CULT: NORMAL
GRAM STN SPEC: NORMAL
SERVICE CMNT-IMP: NORMAL

## 2017-01-06 RX ORDER — OXYCODONE AND ACETAMINOPHEN 7.5; 325 MG/1; MG/1
1 TABLET ORAL
Qty: 45 TAB | Refills: 0 | Status: ON HOLD | OUTPATIENT
Start: 2017-01-06 | End: 2020-10-28

## 2017-01-06 NOTE — MR AVS SNAPSHOT
Visit Information Date & Time Provider Department Dept. Phone Encounter #  
 1/6/2017  9:40 AM Madelyn Biggs MD [place] [phone] [id number] Your Appointments 1/10/2017  1:20 PM  
POST OP 10 MIN with MD Candice Bradley [phone] ([facility]) Appt Note: first post op e/r surgery. bring ID&INScards will print paper work off website. fs  
 [address] [facility] [phone]  
[facility] Upcoming Health Maintenance Date Due  
 PAP AKA CERVICAL CYTOLOGY 4/20/2015 INFLUENZA AGE 9 TO ADULT 8/1/2016 DTaP/Tdap/Td series (6 - Td) 3/27/2025 Allergies as of 1/6/2017  Review Complete On: 1/6/2017 By: Madelyn Biggs MD  
 No Known Allergies Current Immunizations  Reviewed on 1/5/2017 Name Date DTAP Vaccine 10/23/1995, 1995, 1995, 1995, 1994 HIB Vaccine 8/4/1995, 1994, 1994 Hepatitis B Vaccine 1995, 1995, 1995 Human Papillomavirus 5/29/2009, 3/12/2008, 11/6/2007 MMR Vaccine 7/22/1999, 8/4/1995 Meningococcal Vaccine 7/9/2012, 11/6/2007 OPV 7/22/1999, 1994, 1994, 6/27/1984 PPD 7/9/2012, 2/20/2012 TD Vaccine 8/12/2004 Tdap 3/27/2015 Not reviewed this visit You Were Diagnosed With   
  
 Codes Comments Axillary abscess    -  Primary ICD-10-CM: L02.419 ICD-9-CM: 682.3 Postoperative examination     ICD-10-CM: Q55 ICD-9-CM: V67.00 Wound, open axilla, left, subsequent encounter     ICD-10-CM: S41.102D ICD-9-CM: V58.89, 880.02 Vitals BP Pulse Temp Resp Height(growth percentile) Weight(growth percentile) 120/80 78 98.2 °F (36.8 °C) (Oral) 18 5' 8\" (1.727 m) 238 lb 8 oz (108.2 kg) SpO2 BMI OB Status Smoking Status  98% 36.26 kg/m2 Having regular periods Passive Smoke Exposure - Never Smoker Vitals History BMI and BSA Data Body Mass Index Body Surface Area  
 36.26 kg/m 2 2.28 m 2 Preferred Pharmacy Pharmacy Name Phone [id number] Mirella Moraes, [facility] [phone] Your Updated Medication List  
  
   
This list is accurate as of: 1/6/17 11:16 AM.  Always use your most recent med list.  
  
  
  
  
 cabergoline 0.5 mg tablet Commonly known as:  DOSTINEX Take 0.25 mg by mouth two (2) times a week. naproxen 500 mg tablet Commonly known as:  NAPROSYN Take 1 Tab by mouth two (2) times daily (with meals) for 5 days. OTHER Allergy shots once a week. oxyCODONE-acetaminophen 7.5-325 mg per tablet Commonly known as:  PERCOCET 7.5 Take 1 Tab by mouth every four (4) hours as needed for Pain. Max Daily Amount: 6 Tabs. Prescriptions Printed Refills  
 oxyCODONE-acetaminophen (PERCOCET 7.5) 7.5-325 mg per tablet 0 Sig: Take 1 Tab by mouth every four (4) hours as needed for Pain. Max Daily Amount: 6 Tabs. Class: Print Route: Oral  
  
To-Do List   
 01/06/2017 2:30 PM  
  Appointment with Saulo Blevins at [facility] ([phone])  
  
 01/07/2017 9:00 AM  
  Appointment with [address] [place] [address] - [facility] ([phone])  
  
 01/08/2017 9:00 AM  
  Appointment with [address] [place] [address] - [facility] ([phone])  
  
 01/09/2017  2:30 PM  
  Appointment with Saulo Blevins at [facility] ([phone])  
  
 01/11/2017 2:00 PM  
  Appointment with Saulo Blevins [facility] ([phone]) [facility]! Dear Francoise Rahman: Thank you for requesting a [employer] account. Our records indicate that you already have an active [employer] account. You can access your account anytime at [email or web address] Did you know that you can access your hospital and ER discharge instructions at any time in [employer]? You can also review all of your test results from your hospital stay or ER visit. Additional Information If you have questions, please visit the Frequently Asked Questions section of the [employer] website at [email or web address] Remember, [employer] is NOT to be used for urgent needs. For medical emergencies, dial 911. Now available from your iPhone and Android! Please provide this summary of care documentation to your next provider. Your primary care clinician is listed as Travis Magana. If you have any questions after today's visit, please call [phone].

## 2017-01-07 ENCOUNTER — HOSPITAL ENCOUNTER (OUTPATIENT)
Dept: INFUSION THERAPY | Age: 22
Discharge: HOME | End: 2017-01-07
Payer: COMMERCIAL

## 2017-01-08 ENCOUNTER — HOSPITAL ENCOUNTER (OUTPATIENT)
Dept: INFUSION THERAPY | Age: 22
Discharge: HOME | End: 2017-01-08
Payer: COMMERCIAL

## 2017-01-08 VITALS
RESPIRATION RATE: 18 BRPM | HEART RATE: 114 BPM | SYSTOLIC BLOOD PRESSURE: 137 MMHG | DIASTOLIC BLOOD PRESSURE: 79 MMHG | TEMPERATURE: 97.3 F

## 2017-01-08 PROCEDURE — 97602 WOUND(S) CARE NON-SELECTIVE: CPT

## 2017-01-08 NOTE — PROGRESS NOTES
Problem: Patient Education: Go to Education Activity  Goal: Patient/Family Education  Outcome: Progressing Towards Goal  Pt aware to ask questions when they arise

## 2017-01-08 NOTE — PROGRESS NOTES
0900 pt arrived to [facility] ambulatory. Assessment completed. Pt denies any distress or discomfort at this time. Visit Vitals    /79 (BP 1 Location: Left arm, BP Patient Position: At rest)    Pulse (!) 114    Temp 97.3 °F (36.3 °C)    Resp 18       Wound noted in left axillary area. Old dressing removed. sanguinous fluid noted. Odor noted but skin without redness or heat. Bed of wound red and beefy. Cleaned with sterile saline and dressing applied sterile per order. 0920 pt tolerated well. Pt denies any distress or discomfort at this time. Pt discharged home ambulatory with next apt.

## 2017-01-09 ENCOUNTER — HOSPITAL ENCOUNTER (OUTPATIENT)
Dept: INFUSION THERAPY | Age: 22
Discharge: HOME | End: 2017-01-09
Payer: COMMERCIAL

## 2017-01-09 VITALS — DIASTOLIC BLOOD PRESSURE: 78 MMHG | RESPIRATION RATE: 18 BRPM | SYSTOLIC BLOOD PRESSURE: 129 MMHG | TEMPERATURE: 97.7 F

## 2017-01-09 PROCEDURE — 97602 WOUND(S) CARE NON-SELECTIVE: CPT

## 2017-01-09 NOTE — PROGRESS NOTES
Outpatient Infusion Center Short Visit Progress Note    1430 Pt admit to [facility] for dressing change to left axilla ambulatory in stable condition. Assessment completed. No new concerns voiced. Visit Vitals    /78 (BP 1 Location: Right arm, BP Patient Position: Sitting)    Temp 97.7 °F (36.5 °C)    Resp 25     Removed old dressing, irrigated wound with normal saline. Wound has sanguinous drainage with slight odor. Patient's tolerance for dressing change improving. Packed wound with two inch wet evan, 4x4 and ABD applied to wound and secured with medipore tape. Medications:  Normal saline flushes    1455 Pt tolerated treatment well. D/c home ambulatory in no distress. Pt aware of next appointment scheduled for 1/10/17 at 1300.

## 2017-01-10 ENCOUNTER — TELEPHONE (OUTPATIENT)
Dept: SURGERY | Age: 22
End: 2017-01-10

## 2017-01-10 ENCOUNTER — APPOINTMENT (OUTPATIENT)
Dept: INFUSION THERAPY | Age: 22
End: 2017-01-10
Payer: COMMERCIAL

## 2017-01-10 ENCOUNTER — HOSPITAL ENCOUNTER (OUTPATIENT)
Dept: INFUSION THERAPY | Age: 22
Discharge: HOME | End: 2017-01-10
Payer: COMMERCIAL

## 2017-01-10 VITALS
OXYGEN SATURATION: 98 % | SYSTOLIC BLOOD PRESSURE: 122 MMHG | TEMPERATURE: 97.4 F | DIASTOLIC BLOOD PRESSURE: 77 MMHG | RESPIRATION RATE: 16 BRPM | HEART RATE: 86 BPM

## 2017-01-10 PROCEDURE — 97602 WOUND(S) CARE NON-SELECTIVE: CPT

## 2017-01-10 NOTE — TELEPHONE ENCOUNTER
Spoke with Vianca Alcaraz with Infusion center regarding wound care. She will fax form for Dr. Corie Wan to sign. Continue wound care for 14 more days.

## 2017-01-10 NOTE — TELEPHONE ENCOUNTER
Spoke with patient regarding how much longer she needs to continue wound care at infusion center. Per  14 more days.

## 2017-01-10 NOTE — PROGRESS NOTES
1320 Pt admit to [facility] for daily wound care ambulatory in stable condition. Accompanied by supportive mother. Assessment completed. No new concerns voiced. Wound bleeding with old dressing removal.    Visit Vitals    /77 (BP 1 Location: Right arm, BP Patient Position: Sitting)    Pulse 86    Temp 97.4 °F (36.3 °C)    Resp 16    SpO2 98%     1345 Pt tolerated treatment well. D/c home ambulatory in no distress. Pt aware of next appointment scheduled for 1/11/17. WOUND POA CONDITIONS    Wound Arm Left (Active)   DRESSING STATUS Breakthrough drainage 1/10/2017  1:30 PM   DRESSING TYPE 4 x 4;ABD binder;Moist to dry;Packing 1/10/2017  1:30 PM   Incision site well approximated? No 1/10/2017  1:30 PM   Wound Length (cm) 5.5 cm 1/8/2017  9:12 AM   Wound Width (cm) 1 cm 1/8/2017  9:12 AM   Condition of Base Slough 1/10/2017  1:30 PM   Condition of Edges Rolled/curled 1/10/2017  1:30 PM   Tissue Type Pink;Red 1/10/2017  1:30 PM   Drainage Amount  Small  1/10/2017  1:30 PM   Drainage Color Serosanguinous 1/10/2017  1:30 PM   Wound Odor Musty 1/10/2017  1:30 PM   Periwound Skin Condition Erythema, blanchable 1/10/2017  1:30 PM   Cleansing and Cleansing Agents  Normal saline 1/10/2017  1:30 PM   Dressing Type Applied 4 x 4;ABD pad;Gauze wrap (evan); Moist to dry;Packing 1/10/2017  1:30 PM   Procedure Tolerated Fair 1/10/2017  1:30 PM   Number of days:6

## 2017-01-11 ENCOUNTER — HOSPITAL ENCOUNTER (OUTPATIENT)
Dept: INFUSION THERAPY | Age: 22
Discharge: HOME | End: 2017-01-11
Payer: COMMERCIAL

## 2017-01-11 VITALS
HEART RATE: 91 BPM | TEMPERATURE: 98.3 F | RESPIRATION RATE: 16 BRPM | DIASTOLIC BLOOD PRESSURE: 75 MMHG | SYSTOLIC BLOOD PRESSURE: 136 MMHG

## 2017-01-11 PROCEDURE — 97602 WOUND(S) CARE NON-SELECTIVE: CPT

## 2017-01-11 NOTE — PROGRESS NOTES
Outpatient Infusion Center Short Visit Progress Note    3545 Pt admit to [facility] for daily wound care ambulatory in stable condition. Assessment completed. No new concerns voiced. Visit Vitals    /75 (BP 1 Location: Right arm, BP Patient Position: Sitting)    Pulse 91    Temp 98.3 °F (36.8 °C)    Resp 16           WOUND POA CONDITIONS    Wound Arm Left (Active)   DRESSING STATUS Breakthrough drainage 1/11/2017  2:41 PM   DRESSING TYPE 4 x 4;ABD pad;Packing 1/11/2017  2:41 PM   Incision site well approximated? No 1/11/2017  2:41 PM   Wound Length (cm) 5.5 cm 1/8/2017  9:12 AM   Wound Width (cm) 1 cm 1/8/2017  9:12 AM   Condition of Base Other (comment) 1/11/2017  2:41 PM   Condition of Edges Rolled/curled 1/10/2017  1:30 PM   Tissue Type Red;Pink 1/11/2017  2:41 PM   Drainage Amount  Small  1/11/2017  2:41 PM   Drainage Color Sanguinous 1/11/2017  2:41 PM   Wound Odor Foul;Pungent 1/11/2017  2:41 PM   Periwound Skin Condition Erythema, blanchable 1/11/2017  2:41 PM   Cleansing and Cleansing Agents  Normal saline 1/11/2017  2:41 PM   Dressing Type Applied 4 x 4;ABD pad;Gauze wrap (evan);Packing; Other (Comment) 1/11/2017  2:41 PM   Procedure Tolerated Fair 1/11/2017  2:41 PM   Number of days:7               4140 Pt tolerated treatment well. D/c home ambulatory in no distress. Pt aware of next appointment scheduled for 1/11/17.

## 2017-01-12 ENCOUNTER — HOSPITAL ENCOUNTER (OUTPATIENT)
Dept: INFUSION THERAPY | Age: 22
Discharge: HOME | End: 2017-01-12
Payer: COMMERCIAL

## 2017-01-12 VITALS
DIASTOLIC BLOOD PRESSURE: 75 MMHG | RESPIRATION RATE: 16 BRPM | SYSTOLIC BLOOD PRESSURE: 127 MMHG | HEART RATE: 64 BPM | TEMPERATURE: 97.7 F

## 2017-01-12 PROCEDURE — 97602 WOUND(S) CARE NON-SELECTIVE: CPT

## 2017-01-12 NOTE — PROGRESS NOTES
Outpatient Infusion Center Short Visit Progress Note    8832 Pt admit to [facility] for wound care ambulatory in stable condition. Assessment completed. No new concerns voiced. WOUND POA CONDITIONS    Wound Arm Left (Active)   DRESSING STATUS Breakthrough drainage 1/12/2017  5:07 PM   DRESSING TYPE 4 x 4;ABD binder 1/12/2017  5:07 PM   Incision site well approximated? No 1/11/2017  2:41 PM   Wound Length (cm) 5.5 cm 1/8/2017  9:12 AM   Wound Width (cm) 1 cm 1/8/2017  9:12 AM   Condition of Base Other (comment) 1/11/2017  2:41 PM   Condition of Edges Rolled/curled 1/10/2017  1:30 PM   Tissue Type Red;Pink 1/12/2017  5:07 PM   Drainage Amount  Small  1/12/2017  5:07 PM   Drainage Color Sanguinous 1/12/2017  5:07 PM   Wound Odor Musty 1/12/2017  5:07 PM   Periwound Skin Condition Erythema, blanchable 1/11/2017  2:41 PM   Cleansing and Cleansing Agents  Normal saline 1/12/2017  5:07 PM   Dressing Type Applied 4 x 4;ABD binder;Gauze 1/12/2017  5:07 PM   Procedure Tolerated Fair 1/12/2017  5:07 PM   Number of days:8             Visit Vitals    /75    Pulse 64    Temp 97.7 °F (36.5 °C)    Resp 16           1515 Pt tolerated treatment well. D/c home ambulatory in no distress. Pt aware of next appointment scheduled for 1/13/17.

## 2017-01-13 ENCOUNTER — HOSPITAL ENCOUNTER (OUTPATIENT)
Dept: INFUSION THERAPY | Age: 22
Discharge: HOME | End: 2017-01-13
Payer: COMMERCIAL

## 2017-01-13 VITALS
RESPIRATION RATE: 16 BRPM | TEMPERATURE: 97.6 F | HEART RATE: 91 BPM | SYSTOLIC BLOOD PRESSURE: 126 MMHG | DIASTOLIC BLOOD PRESSURE: 78 MMHG

## 2017-01-13 PROCEDURE — 97602 WOUND(S) CARE NON-SELECTIVE: CPT

## 2017-01-13 NOTE — PROGRESS NOTES
Outpatient Infusion Center Short Visit Progress Note    3695 Pt admit to [facility] for wound care left axilla ambulatory in stable condition. Assessment completed. No new concerns voiced. Visit Vitals    /78 (BP 1 Location: Right arm, BP Patient Position: Sitting)    Pulse 91    Temp 97.6 °F (36.4 °C)    Resp 16    Breastfeeding No       Left axilla dressing removed and flushed with normal saline. Repacked with one inch plain packing. 4x4, ABD, and medipore tape applied to wound. Not much of an odor noted. Drainage still sanguinous but less with each dressing change. Medications:  Normal saline flushes    1435 Pt tolerated treatment well. D/c home ambulatory in no distress. Pt aware of next appointment scheduled for 1/14/17 at 0830.

## 2017-01-14 ENCOUNTER — HOSPITAL ENCOUNTER (OUTPATIENT)
Dept: INFUSION THERAPY | Age: 22
Discharge: HOME | End: 2017-01-14
Payer: COMMERCIAL

## 2017-01-14 VITALS
HEART RATE: 77 BPM | SYSTOLIC BLOOD PRESSURE: 123 MMHG | TEMPERATURE: 97.1 F | DIASTOLIC BLOOD PRESSURE: 82 MMHG | OXYGEN SATURATION: 99 % | RESPIRATION RATE: 18 BRPM

## 2017-01-14 PROCEDURE — 97602 WOUND(S) CARE NON-SELECTIVE: CPT

## 2017-01-14 NOTE — PROGRESS NOTES
0830 pt arrived to [facility] ambulatory. Assessment completed. Pt denies any distress or discomfort at this time. Visit Vitals    /82    Pulse 77    Temp 97.1 °F (36.2 °C)    Resp 18    SpO2 99%        Wound noted in left axillary area. Old dressing removed. sanguinous fluid noted. Odor noted but skin without redness or heat. Bed of wound red and beefy. Cleaned with sterile saline and dressing applied sterile per order.      0850 pt tolerated well. Pt denies any distress or discomfort at this time. Pt discharged home ambulatory with next apt.

## 2017-01-15 ENCOUNTER — HOSPITAL ENCOUNTER (OUTPATIENT)
Dept: INFUSION THERAPY | Age: 22
Discharge: HOME | End: 2017-01-15
Payer: COMMERCIAL

## 2017-01-15 VITALS
SYSTOLIC BLOOD PRESSURE: 123 MMHG | DIASTOLIC BLOOD PRESSURE: 69 MMHG | TEMPERATURE: 97.6 F | RESPIRATION RATE: 18 BRPM | HEART RATE: 98 BPM

## 2017-01-15 PROCEDURE — 77030019895 HC PCKNG STRP IODO -A

## 2017-01-15 PROCEDURE — 97602 WOUND(S) CARE NON-SELECTIVE: CPT

## 2017-01-15 NOTE — PROGRESS NOTES
0830 Pt arrived at [facility] ambulatory and in no distress for wound care to left axilla. No new complaints voiced. Visit Vitals    /69 (BP 1 Location: Right arm, BP Patient Position: Sitting)    Pulse 98    Temp 97.6 °F (36.4 °C)    Resp 25     Removed old dressing, noted small amount of serosanguinous drainage. Cleansed wound and repacked iodoform packing into left axilla wound. Topped with 2x2's, an ABD pad, and secured with paper tape. 0840 Tolerated treatment well, no adverse reaction noted. D/Cd from [facility] ambulatory and in no distress accompanied by self. Next appt 1/16/17 @ 1000.

## 2017-01-16 ENCOUNTER — HOSPITAL ENCOUNTER (OUTPATIENT)
Dept: INFUSION THERAPY | Age: 22
Discharge: HOME | End: 2017-01-16
Payer: COMMERCIAL

## 2017-01-16 VITALS
RESPIRATION RATE: 16 BRPM | TEMPERATURE: 97.8 F | SYSTOLIC BLOOD PRESSURE: 121 MMHG | HEART RATE: 77 BPM | DIASTOLIC BLOOD PRESSURE: 77 MMHG

## 2017-01-16 PROCEDURE — 97602 WOUND(S) CARE NON-SELECTIVE: CPT

## 2017-01-16 NOTE — PROGRESS NOTES
Problem: Patient Education: Go to Education Activity  Goal: Patient/Family Education  Outcome: Progressing Towards Goal  Wound improving

## 2017-01-16 NOTE — PROGRESS NOTES
Outpatient Infusion Center Short Visit Progress Note    1000 Pt admit to [facility] for left axillary wound care ambulatory in stable condition. Assessment completed. No new concerns voiced. Removed dressing and flushed with normal saline. Repacked wound with 1 in packing wet, 4x4 and tape applied to wound. NO odor noted. Scant drainage on 4x4, wound has granulated in with pink wound bed, edges on the inferior side is \"curling\" in.      Visit Vitals    /77 (BP 1 Location: Right arm, BP Patient Position: Sitting)    Pulse 77    Temp 97.8 °F (36.6 °C)    Resp 16     Medications:  Normal saline flushes    1015 Pt tolerated treatment well. D/c home ambulatory in no distress. Pt aware of next appointment scheduled for 1/17/17 at 1300.

## 2017-01-17 ENCOUNTER — HOSPITAL ENCOUNTER (OUTPATIENT)
Dept: INFUSION THERAPY | Age: 22
Discharge: HOME | End: 2017-01-17
Payer: COMMERCIAL

## 2017-01-17 VITALS
HEART RATE: 86 BPM | SYSTOLIC BLOOD PRESSURE: 119 MMHG | RESPIRATION RATE: 16 BRPM | TEMPERATURE: 98.1 F | DIASTOLIC BLOOD PRESSURE: 81 MMHG

## 2017-01-17 PROCEDURE — 97602 WOUND(S) CARE NON-SELECTIVE: CPT

## 2017-01-17 NOTE — PROGRESS NOTES
Outpatient Infusion Center Short Visit Progress Note    4687 Pt admit to [facility] for wound care (L axilla) ambulatory in stable condition. Assessment completed. No new concerns voiced. Old dressing removed, small drainage; cleaned w/ saline; packed, 4x4 applied and secured with tape. Pt states she has MD appt this Friday. Please see CC for wound documentation. Visit Vitals    /81    Pulse 86    Temp 98.1 °F (36.7 °C)    Resp 16       1335 Pt tolerated treatment well. D/c home ambulatory in no distress. Pt aware of next appointment scheduled for 01/18/2017.

## 2017-01-18 ENCOUNTER — HOSPITAL ENCOUNTER (OUTPATIENT)
Dept: INFUSION THERAPY | Age: 22
Discharge: HOME | End: 2017-01-18
Payer: COMMERCIAL

## 2017-01-18 VITALS
TEMPERATURE: 97 F | RESPIRATION RATE: 16 BRPM | DIASTOLIC BLOOD PRESSURE: 81 MMHG | OXYGEN SATURATION: 98 % | SYSTOLIC BLOOD PRESSURE: 121 MMHG | HEART RATE: 85 BPM

## 2017-01-18 PROCEDURE — 97602 WOUND(S) CARE NON-SELECTIVE: CPT

## 2017-01-18 NOTE — PROGRESS NOTES
Outpatient Infusion Center Short Visit Progress Note    1300 Pt admit to [facility] for Wound Care Left arm axillary  ambulatory in stable condition. Assessment completed. No new concerns voiced. Old dressing removed, small drainage, clean with normal saline, packed, 4x4 applies and secured with tape. Patient states she has MD appt this Friday. Please see CC for wound documentation. Visit Vitals    /81    Pulse 85    Temp 97 °F (36.1 °C)    Resp 16    SpO2 98%               1315Pt tolerated treatment well. D/c home ambulatory in no distress. Pt aware of next appointment scheduled for 1/19/2017.

## 2017-01-19 ENCOUNTER — HOSPITAL ENCOUNTER (OUTPATIENT)
Dept: INFUSION THERAPY | Age: 22
Discharge: HOME | End: 2017-01-19
Payer: COMMERCIAL

## 2017-01-19 VITALS
SYSTOLIC BLOOD PRESSURE: 128 MMHG | OXYGEN SATURATION: 99 % | HEART RATE: 75 BPM | RESPIRATION RATE: 16 BRPM | TEMPERATURE: 97.3 F | DIASTOLIC BLOOD PRESSURE: 85 MMHG

## 2017-01-19 NOTE — PROGRESS NOTES
1315 Pt admit to [facility] for Wound Care Left arm axillary  ambulatory in stable condition. Assessment completed. No new concerns voiced. Old dressing removed, small drainage, clean with normal saline, packed, 4x4 applies and secured with tape. Patient states she has MD appt this tomorrow. Please see CC for wound documentation.     Patient Vitals for the past 12 hrs:   Temp Pulse Resp BP SpO2   01/19/17 1314 97.3 °F (36.3 °C) 75 16 128/85 99 %                1345Pt tolerated treatment well. D/c home ambulatory in no distress. Pt aware of next appointment scheduled for 1/20/2017.

## 2017-01-19 NOTE — PROGRESS NOTES
Problem: Patient Education: Go to Education Activity  Goal: Patient/Family Education  Outcome: Progressing Towards Goal  Wound care

## 2017-01-20 ENCOUNTER — OFFICE VISIT (OUTPATIENT)
Dept: SURGERY | Age: 22
End: 2017-01-20

## 2017-01-20 VITALS
RESPIRATION RATE: 20 BRPM | HEIGHT: 69 IN | TEMPERATURE: 97.9 F | DIASTOLIC BLOOD PRESSURE: 86 MMHG | WEIGHT: 234 LBS | OXYGEN SATURATION: 98 % | SYSTOLIC BLOOD PRESSURE: 126 MMHG | HEART RATE: 92 BPM | BODY MASS INDEX: 34.66 KG/M2

## 2017-01-20 DIAGNOSIS — Z51.89 ENCOUNTER FOR WOUND CARE: ICD-10-CM

## 2017-01-20 DIAGNOSIS — Z09 SURGICAL FOLLOW-UP CARE: Primary | ICD-10-CM

## 2017-01-20 DIAGNOSIS — L02.419 AXILLARY ABSCESS: ICD-10-CM

## 2017-01-20 NOTE — PROGRESS NOTES
Subjective:   Suzie Riley is a 24year old female that is 18 days s/p incision and drainage of left axillary abscess with Dr. Yadira Feng. Patient comes in office for surgical follow up and wound care. Patient receiving once daily wound care at [facility]. Receive wet to dry dressing change with packing. Patient stated she tolerating well. Denies fever, no chills, no chest pain. Stated cleaning wound in shower with antibacterial soap before wound care daily. Denies any pain at site, no pain medication in use. Denies having to change any additional times a a day. Objective:     Blood pressure 126/86, pulse 92, temperature 97.9 °F (36.6 °C), resp. rate 20, height 5' 9\" (1.753 m), weight 234 lb (106.1 kg), SpO2 98 %. Wound:  Location: axilla(e):left  Wound more superficial. No packing required. Wound clean, dry, mild erythema, good granulation tissue, healing  periwound skin intact, no erythema or induration. Wound cleaned with saline. Wound gel placed in wound and covered with 2x2's and tape. Patient tolerate well. Assessment:       18 days s/p incision and drainage of left axillary abscess  Plan:     1. Wound care discussed. Will discontinue use of [facility] Clinic. Patient to before once daily wound care to left axilla with applying wound gel, dry dressing, and tape. Patient to continue showering with antibacterial soap prior to wound care. Prescription for wound gel given. 2. Pt is to increase activities as tolerated. 3. Follow-up in office in 2 weeks. Patient verbalized understanding and questions were answered to the best of my knowledge and ability. Wound care  educational materials were provided. Advised if any questions or concerns to call the office.

## 2017-01-20 NOTE — PATIENT INSTRUCTIONS
Wound Care: After Your Visit  Your Care Instructions  Taking good care of your wound at home will help it heal quickly and reduce your chance of infection. The doctor has checked you carefully, but problems can develop later. If you notice any problems or new symptoms, get medical treatment right away. Follow-up care is a key part of your treatment and safety. Be sure to make and go to all appointments, and call your doctor if you are having problems. It's also a good idea to know your test results and keep a list of the medicines you take. How can you care for yourself at home? · Clean the area with soap and water 2 times a day unless your doctor gives you different instructions. Don't use hydrogen peroxide or alcohol, which can slow healing. ¨ You may cover the wound with a thin layer of wound gel to axilla daily, and a nonstick bandage. ¨ Apply more ointment and replace the bandage as needed. · Take pain medicines exactly as directed. Some pain is normal with a wound, but do not ignore pain that is getting worse instead of better. You could have an infection. ¨ If the doctor gave you a prescription medicine for pain, take it as prescribed. ¨ If you are not taking a prescription pain medicine, ask your doctor if you can take an over-the-counter medicine. · Your doctor may have closed your wound with stitches (sutures), staples, or skin glue. ¨ If you have stitches, your doctor may remove them after several days to 2 weeks. Or you may have stitches that dissolve on their own. ¨ If you have staples, your doctor may remove them after 7 to 10 days. ¨ If your wound was closed with skin glue, the glue will wear off in a few days to 2 weeks. When should you call for help? Call your doctor now or seek immediate medical care if:  · You have signs of infection, such as:  ¨ Increased pain, swelling, warmth, or redness near the wound. ¨ Red streaks leading from the wound. ¨ Pus draining from the wound.   ¨ A fever.  · You bleed so much from your incision that you soak one or more bandages over 2 to 4 hours. Watch closely for changes in your health, and be sure to contact your doctor if:  · The wound is not getting better each day. Where can you learn more? Go to Tivix.be  Enter M973 in the search box to learn more about \"Wound Care: After Your Visit. \"   © 2114-6422 Healthwise, Incorporated. Care instructions adapted under license by Eyal Galeas (which disclaims liability or warranty for this information). This care instruction is for use with your licensed healthcare professional. If you have questions about a medical condition or this instruction, always ask your healthcare professional. [place] [address] any warranty or liability for your use of this information. Content Version: 62.9.063441;  Last Revised: April 23, 2012

## 2017-01-20 NOTE — MR AVS SNAPSHOT
Visit Information Date & Time Provider Department Dept. Phone Encounter #  
 1/20/2017  9:20 AM Hayde Santana NP [facility] 22 [phone] [id number] Follow-up Instructions Return in about 2 weeks (around 2/3/2017). Upcoming Health Maintenance Date Due  
 PAP AKA CERVICAL CYTOLOGY 4/20/2015 INFLUENZA AGE 9 TO ADULT 8/1/2016 DTaP/Tdap/Td series (6 - Td) 3/27/2025 Allergies as of 1/20/2017  Review Complete On: 1/20/2017 By: Lianet Mtz LPN No Known Allergies Current Immunizations  Reviewed on 1/19/2017 Name Date DTAP Vaccine 10/23/1995, 1994, 1994, 1994, 1994 HIB Vaccine 8/4/1995, 1994, 1994 Hepatitis B Vaccine 1994, 1994, 1994 Human Papillomavirus 5/29/2009, 3/12/2008, 11/6/2007 MMR Vaccine 7/22/1999, 8/4/1995 Meningococcal Vaccine 7/9/2012, 11/6/2007 OPV 7/22/1999, 1994, 1994, 6/27/1984 PPD 7/9/2012, 2/20/2012 TD Vaccine 8/12/2004 Tdap 3/27/2015 Not reviewed this visit You Were Diagnosed With   
  
 Codes Comments Surgical follow-up care    -  Primary ICD-10-CM: G72 ICD-9-CM: V67.00 Encounter for wound care     ICD-10-CM: Z51.89 ICD-9-CM: V58.89 Axillary abscess     ICD-10-CM: L02.419 ICD-9-CM: 878. 3 Vitals BP Pulse Temp Resp Height(growth percentile) Weight(growth percentile) 126/86 (BP 1 Location: Left arm, BP Patient Position: Sitting) 92 97.9 °F (36.6 °C) 20 5' 9\" (1.753 m) 234 lb (106.1 kg) SpO2 BMI OB Status Smoking Status 98% 34.56 kg/m2 Having regular periods Passive Smoke Exposure - Never Smoker Vitals History BMI and BSA Data Body Mass Index Body Surface Area 34.56 kg/m 2 2.27 m 2 Preferred Pharmacy Pharmacy Name Phone [id number] [place], [facility] [phone] Your Updated Medication List  
  
   
 This list is accurate as of: 1/20/17 10:09 AM.  Always use your most recent med list.  
  
  
  
  
 cabergoline 0.5 mg tablet Commonly known as:  DOSTINEX Take 0.25 mg by mouth two (2) times a week.  
  
 gel Dressing topical gel Commonly known as:  [place] 3821 Apply  to affected area daily. OTHER Allergy shots once a week. oxyCODONE-acetaminophen 7.5-325 mg per tablet Commonly known as:  PERCOCET 7.5 Take 1 Tab by mouth every four (4) hours as needed for Pain. Max Daily Amount: 6 Tabs. Prescriptions Printed Refills  
 gel Dressing (CURASOL WOUND DRESSING) topical gel 1 Sig: Apply  to affected area daily. Class: Print Route: Topical  
  
Follow-up Instructions Return in about 2 weeks (around 2/3/2017). Patient Instructions Wound Care: After Your Visit Your Care Instructions Taking good care of your wound at home will help it heal quickly and reduce your chance of infection. The doctor has checked you carefully, but problems can develop later. If you notice any problems or new symptoms, get medical treatment right away. Follow-up care is a key part of your treatment and safety. Be sure to make and go to all appointments, and call your doctor if you are having problems. It's also a good idea to know your test results and keep a list of the medicines you take. How can you care for yourself at home? · Clean the area with soap and water 2 times a day unless your doctor gives you different instructions. Don't use hydrogen peroxide or alcohol, which can slow healing. ¨ You may cover the wound with a thin layer of wound gel to axilla daily, and a nonstick bandage. ¨ Apply more ointment and replace the bandage as needed. · Take pain medicines exactly as directed. Some pain is normal with a wound, but do not ignore pain that is getting worse instead of better. You could have an infection. ¨ If the doctor gave you a prescription medicine for pain, take it as prescribed. ¨ If you are not taking a prescription pain medicine, ask your doctor if you can take an over-the-counter medicine. · Your doctor may have closed your wound with stitches (sutures), staples, or skin glue. ¨ If you have stitches, your doctor may remove them after several days to 2 weeks. Or you may have stitches that dissolve on their own. ¨ If you have staples, your doctor may remove them after 7 to 10 days. ¨ If your wound was closed with skin glue, the glue will wear off in a few days to 2 weeks. When should you call for help? Call your doctor now or seek immediate medical care if: 
· You have signs of infection, such as: 
¨ Increased pain, swelling, warmth, or redness near the wound. ¨ Red streaks leading from the wound. ¨ Pus draining from the wound. ¨ A fever. · You bleed so much from your incision that you soak one or more bandages over 2 to 4 hours. Watch closely for changes in your health, and be sure to contact your doctor if: · The wound is not getting better each day. Where can you learn more? Go to Jangl SMS.be Enter K295 in the search box to learn more about \"Wound Care: After Your Visit. \"  
© 3129-0816 Healthwise, Incorporated. Care instructions adapted under license by Lindsey Chávez (which disclaims liability or warranty for this information). This care instruction is for use with your licensed healthcare professional. If you have questions about a medical condition or this instruction, always ask your healthcare professional. [place] [address] any warranty or liability for your use of this information. Content Version: 32.5.677960; Last Revised: April 23, 2012 Introducing [facility]! Dear Maya Jett: Thank you for requesting a [employer] account. Our records indicate that you already have an active [employer]zer account.   You can access your account anytime at [email or web address] Did you know that you can access your hospital and ER discharge instructions at any time in Cloudfinder? You can also review all of your test results from your hospital stay or ER visit. Additional Information If you have questions, please visit the Frequently Asked Questions section of the Cloudfinder website at [email or web address] Remember, Cloudfinder is NOT to be used for urgent needs. For medical emergencies, dial 911. Now available from your iPhone and Android! Please provide this summary of care documentation to your next provider. Your primary care clinician is listed as Travis Magana. If you have any questions after today's visit, please call [phone].

## 2017-01-20 NOTE — PROGRESS NOTES
1. Have you been to the ER, urgent care clinic since your last visit? Hospitalized since your last visit? No    2. Have you seen or consulted any other health care providers outside of the [facility] since your last visit? Include any pap smears or colon screening.  allergist

## 2017-01-27 ENCOUNTER — TELEPHONE (OUTPATIENT)
Dept: SURGERY | Age: 22
End: 2017-01-27

## 2017-01-27 NOTE — TELEPHONE ENCOUNTER
Spoke with patient who states drainage on dressing looks green. Denies fever ,pain or odor. Patient has an appointment to be seen in office next weeks. If fever, odor or  pain make an appointment to be seen sooner in office.

## 2017-02-02 ENCOUNTER — OFFICE VISIT (OUTPATIENT)
Dept: SURGERY | Age: 22
End: 2017-02-02

## 2017-02-02 VITALS
HEIGHT: 69 IN | OXYGEN SATURATION: 98 % | DIASTOLIC BLOOD PRESSURE: 76 MMHG | TEMPERATURE: 98.4 F | HEART RATE: 56 BPM | BODY MASS INDEX: 34.36 KG/M2 | RESPIRATION RATE: 18 BRPM | WEIGHT: 232 LBS | SYSTOLIC BLOOD PRESSURE: 100 MMHG

## 2017-02-02 DIAGNOSIS — Z09 POSTOPERATIVE EXAMINATION: Primary | ICD-10-CM

## 2017-02-02 NOTE — PROGRESS NOTES
1. Have you been to the ER, urgent care clinic since your last visit? Hospitalized since your last visit? no    2. Have you seen or consulted any other health care providers outside of the [address] [place] [address] since your last visit? Include any pap smears or colon screening.   No

## 2019-09-03 NOTE — TELEPHONE ENCOUNTER
Spoke with patient regarding stronger pain medication. Per David Moncada FNP  to take 2 pain pills instead of 1. 30 to 60 minutes prior to wound care. If this does not help then call back for stronger. Patient is taking Hydrocodone-acetaminophen ( Norco) for pain 1 Q 6 H. May take 2 every 4 hours for severe pain.
Spoke with patient regarding wound care is very painful. She would like to have numbing medication prior to wound care. Informed patient we do not get numbing medication. Also would like stronger medication for pain. Informed patient I will call back regarding stronger pain medication.
Warm/Dry

## 2020-01-30 NOTE — PROGRESS NOTES
Physical Therapy Cancellation Note- pt severely agitated, striking out and cursing  Nursing currently with pt  Will defer for now   Xi Muller, PT Subjective:      Tori Michaels is a 24 y.o. female who presents today for wound check. She is status post I&d of left axilla abscess. She is changing the wound daily. Applying wound gel to the area and covering. No complaints. She was conerned about yellow drainage on the guaze. No fevers or chills. Objective:     Visit Vitals    /76 (BP 1 Location: Left arm, BP Patient Position: Sitting)    Pulse (!) 56    Temp 98.4 °F (36.9 °C) (Oral)    Resp 18    Ht 5' 9\" (1.753 m)    Wt 232 lb (105.2 kg)    SpO2 98%    BMI 34.26 kg/m2       Wound:   good granulation tissue, no redness, serous drainage. No pus. Assessment:     Wound check. Plan:   Continue dressing changes daily. Apply wound gel and cover. Shower with antibacterial soap. Follow up in 4 weeks or earlier if need be. Pt verbalized understanding and questions were answered to the best of my knowledge and ability.

## 2020-10-06 ENCOUNTER — VIRTUAL VISIT (OUTPATIENT)
Dept: FAMILY MEDICINE CLINIC | Age: 25
End: 2020-10-06
Payer: COMMERCIAL

## 2020-10-06 DIAGNOSIS — K62.89 RECTAL PAIN: Primary | ICD-10-CM

## 2020-10-06 DIAGNOSIS — R73.9 HYPERGLYCEMIA: ICD-10-CM

## 2020-10-06 DIAGNOSIS — E28.2 PCOS (POLYCYSTIC OVARIAN SYNDROME): ICD-10-CM

## 2020-10-06 DIAGNOSIS — K62.5 BRIGHT RED RECTAL BLEEDING: ICD-10-CM

## 2020-10-06 DIAGNOSIS — R19.7 DIARRHEA, UNSPECIFIED TYPE: ICD-10-CM

## 2020-10-06 PROCEDURE — 99213 OFFICE O/P EST LOW 20 MIN: CPT | Performed by: FAMILY MEDICINE

## 2020-10-06 RX ORDER — HYDROCORTISONE 25 MG/G
CREAM TOPICAL 4 TIMES DAILY
Qty: 30 G | Refills: 0 | Status: SHIPPED | OUTPATIENT
Start: 2020-10-06

## 2020-10-06 NOTE — PROGRESS NOTES
Lindsey Muñoz is a 31 y.o. female     Chief Complaint   Patient presents with    Hemorrhoids    Menstrual Problem     patient stated she has been bleeding in between cycles     1. Have you been to the ER, urgent care clinic since your last visit? Hospitalized since your last visit? No    2. Have you seen or consulted any other health care providers outside of the [address] [place] [address] since your last visit? Include any pap smears or colon screening.  Saw Dr. Purnima Rodriguez in May at [facility]    Patient did not obtain vital signs today    Pain Scale: 7/10  Pain Location: Patient expressed that she had pain yesterday in relation to menstrual issues       Send link to email address listed

## 2020-10-06 NOTE — PROGRESS NOTES
VIRTUAL VISIT      Pursuant to the emergency declaration under the [employer] [facility], 1135 waiver authority and the [employer] and [employer] Act, this Virtual Visit was conducted, with patient's consent, to reduce the patient's risk of exposure to COVID-19 and provide continuity of care for an established patient. Services were provided through a video synchronous discussion virtually to substitute for in-person appointment. Consent:  She and/or her healthcare decision maker is aware that this patient-initiated Telehealth encounter is a billable service, with coverage as determined by her insurance carrier. She is aware that she may receive a bill and has provided verbal consent to proceed: Yes    HISTORY OF PRESENT ILLNESS  Tk Benavidez is a 31 y.o. female presents with Hemorrhoids and Menstrual Problem (patient stated she has been bleeding in between cycles)      Agree with nurse note. Pt with hyperglycemia and PCOS. Pt complains of itchy, hemorrhoid x 1 year with sharp, L rectal pain, bright, red bleeding with wiping, and a bulge in the rectal area. No known trigger. Pain exacerbated during menstrual cycles, decreasing her mobility. She attributed her sxs to endometriosis. She increased fiber intake, cut dairy out of her diet, and uses witch hazel with some relief. She sits on a pillow to limit pressure to the rectal area. She has normally has 1 regular bowel movements every 1-2 days. She had an episode of diarrhea once last week after eating pizza and drinking beer. Pt denies constipation or blood in stool. She requests a referral to Gastroenterology today. Written by julito Castro, as dictated by Dr. Virginia Hashimoto, DO.    ROS    Review of Systems negative except as noted above in HPI.     ALLERGIES:  No Known Allergies    CURRENT MEDICATIONS:      PAST MEDICAL HISTORY:    Past Medical History:   Diagnosis Date    Acne     back    Allergy, unspecified not elsewhere classified 2006    Txd with Immunotherapy. Dr. Aparna Albert Axillary abscess 01/02/2017    Hyperglycemia     Obesity     PCOS (polycystic ovarian syndrome)     Dr. Rosa Simons. Dr. Freddie Bernard.        PAST SURGICAL HISTORY:    Past Surgical History:   Procedure Laterality Date    HX CYST INCISION AND DRAINAGE Left 01/02/2017    left axillary abscess, [place]    HX TYMPANOSTOMY  1999    Rt       FAMILY HISTORY:    Family History   Problem Relation Age of Onset    Asthma Mother     High Cholesterol Mother     Diabetes Paternal Grandmother     Arthritis-osteo Maternal Grandmother     Hypertension Maternal Grandmother     Pancreatic Cancer Maternal Grandmother     Pancreatic Cancer Maternal Aunt     Hypertension Maternal Grandfather     Stroke Maternal Grandfather        SOCIAL HISTORY:    Social History     Socioeconomic History    Marital status: SINGLE     Spouse name: Not on file    Number of children: Not on file    Years of education: Not on file    Highest education level: Not on file   Tobacco Use    Smoking status: Passive Smoke Exposure - Never Smoker    Smokeless tobacco: Never Used    Tobacco comment: lived with smoker dad x 3 yrs   Substance and Sexual Activity    Alcohol use: No    Drug use: No    Sexual activity: Yes     Partners: Male     Birth control/protection: Condom       IMMUNIZATIONS:    Immunization History   Administered Date(s) Administered    DTAP Vaccine 1994, 1994, 1994, 1994, 10/23/1995    HIB Vaccine 1994, 1994, 08/04/1995    Hepatitis B Vaccine 1994, 1994, 1994    Human Papillomavirus 11/06/2007, 03/12/2008, 05/29/2009    MMR Vaccine 08/04/1995, 07/22/1999    Meningococcal Vaccine 11/06/2007, 07/09/2012    OPV 06/27/1984, 1994, 1994, 07/22/1999    PPD 02/20/2012, 07/09/2012    TD Vaccine 08/12/2004    Tdap 03/27/2015 PHYSICAL EXAMINATION (PER VISUAL INSPECTION AND INSTRUCTIONS GIVEN TO PATIENT TO PERFORM)    Due to this being a TeleHealth encounter (During YIKYQ-02 public health emergency), evaluation of the following organ systems was limited to:     Vital Signs  There were no vitals taken for this visit. Weight Metrics 2/2/2017 1/20/2017 1/6/2017 1/2/2017 3/27/2015 9/26/2014 4/22/2013   Weight 232 lb 234 lb 238 lb 8 oz 237 lb 259 lb 12.8 oz 236 lb 8 oz 226 lb 4.8 oz   BMI 34.26 kg/m2 34.56 kg/m2 36.26 kg/m2 36.04 kg/m2 40.68 kg/m2 37.03 kg/m2 35.44 kg/m2       General appearance - Well nourished. Well appearing. Well developed. No acute distress. Obese. Head - Normocephalic. Atraumatic. Eyes - Extraocular eye movements intact. Sclera anicteric. Mildly injected sclera. Ears - Hearing is grossly normal bilaterally. Nose - normal and patent. No discharge. Chest - No visualized signs of difficulty breathing or respiratory distress. Neurological - awake, alert and oriented to person, place, and time and event. Cranial nerves II through XII intact. Clear speech. Musculoskeletal - Intact x 4 extremities. No pain with movement. Psychological -   normal behavior, dress and thought processes. Good insight. Good eye contact. Normal affect. Appropriate mood. Normal speech. DATA REVIEWED    Lab Results   Component Value Date/Time    Hemoglobin (POC) 13.3 01/02/2017 12:05 PM    Hematocrit (POC) 39 01/02/2017 12:05 PM       ASSESSMENT and PLAN      ICD-10-CM ICD-9-CM    1. Rectal pain  K62.89 569.42 hydrocortisone (ANUSOL-HC) 2.5 % rectal cream      REFERRAL TO GASTROENTEROLOGY    Left, with itching due to ?external hemorrhoids vs other   2. Bright red rectal bleeding  K62.5 569.3 hydrocortisone (ANUSOL-HC) 2.5 % rectal cream      REFERRAL TO GASTROENTEROLOGY    due to external hemorrhoids vs other   3. Diarrhea, unspecified type  R19.7 787.91 REFERRAL TO GASTROENTEROLOGY   4.  PCOS (polycystic ovarian syndrome)  E28.2 256.4    5. Hyperglycemia  R73.9 790.29        Discussed the patient's BMI with her. The BMI follow up plan is as follows: I have counseled this patient on diet and exercise regimens. Decrease carbohydrates (white foods, sweet foods, sweet drinks and alcohol), increase green leafy vegetables and protein (lean meats and beans) with each meal.  Avoid fried foods. Eat 3-5 small meals daily. Do not skip meals. Increase water intake. Increase physical activity to 30 minutes daily for health benefit or 60 minutes daily to prevent weight regain, as tolerated. Get 7-8 hours uninterrupted sleep nightly. Chart reviewed and updated. Continue current medications and care. Recommend OTC Preparation H. Start Rx hydrocortisone 2.5% rectal cream QID. Apply creams with a cotton swab. Prescriptions written and sent to pharmacy; medication side effects discussed. Hydrocortisone 2.5% rectal cream  Referrals given; patient urged to keep appointments with specialists. Gastroenterology  Contact information for Dr. Anna Moncada given to pt today. Counseled patient on health concerns: hemorrhoid   Relevant handouts given and discussed with patient. Offered empathy, support, legitimation, prayers, partnership to patient. Praised patient for progress. [facility] the [facility] be able to [facility] results and send me any questions or concerns prior to the next visit where we will go over results in detail. Follow-up and Dispositions    · Return if symptoms worsen or fail to improve. Patient was offered a choice/choices in the treatment plan today. Patient expresses understanding of the plan and agrees with recommendations. 22 mins spent face to face with patient and more than 50% of this time spent in counseling and coordinating care. Written by julito Rajan, as dictated by Dr. Michael Cruz DO.     Documentation True and Accepted by Evelyn Hamilton. Shira Liner.

## 2020-10-24 ENCOUNTER — HOSPITAL ENCOUNTER (OUTPATIENT)
Dept: PREADMISSION TESTING | Age: 25
Discharge: HOME | End: 2020-10-24
Payer: COMMERCIAL

## 2020-10-24 ENCOUNTER — TRANSCRIBE ORDER (OUTPATIENT)
Dept: REGISTRATION | Age: 25
End: 2020-10-24

## 2020-10-24 DIAGNOSIS — Z01.812 PRE-PROCEDURE LAB EXAM: ICD-10-CM

## 2020-10-24 DIAGNOSIS — Z01.812 PRE-PROCEDURE LAB EXAM: Primary | ICD-10-CM

## 2020-10-24 PROCEDURE — 87635 SARS-COV-2 COVID-19 AMP PRB: CPT

## 2020-10-25 LAB — SARS-COV-2, COV2NT: NOT DETECTED

## 2020-10-28 ENCOUNTER — ANESTHESIA (OUTPATIENT)
Dept: ENDOSCOPY | Age: 25
End: 2020-10-28
Payer: COMMERCIAL

## 2020-10-28 ENCOUNTER — ANESTHESIA EVENT (OUTPATIENT)
Dept: ENDOSCOPY | Age: 25
End: 2020-10-28
Payer: COMMERCIAL

## 2020-10-28 ENCOUNTER — HOSPITAL ENCOUNTER (OUTPATIENT)
Age: 25
Setting detail: OUTPATIENT SURGERY
Discharge: HOME | End: 2020-10-28
Attending: INTERNAL MEDICINE | Admitting: INTERNAL MEDICINE
Payer: COMMERCIAL

## 2020-10-28 VITALS
HEIGHT: 67 IN | HEART RATE: 79 BPM | RESPIRATION RATE: 17 BRPM | TEMPERATURE: 98 F | BODY MASS INDEX: 36.88 KG/M2 | SYSTOLIC BLOOD PRESSURE: 130 MMHG | OXYGEN SATURATION: 99 % | DIASTOLIC BLOOD PRESSURE: 80 MMHG | WEIGHT: 235 LBS

## 2020-10-28 PROCEDURE — 74011250636 HC RX REV CODE- 250/636: Performed by: NURSE ANESTHETIST, CERTIFIED REGISTERED

## 2020-10-28 PROCEDURE — 76040000019: Performed by: INTERNAL MEDICINE

## 2020-10-28 PROCEDURE — 76060000031 HC ANESTHESIA FIRST 0.5 HR: Performed by: INTERNAL MEDICINE

## 2020-10-28 RX ORDER — SODIUM CHLORIDE 0.9 % (FLUSH) 0.9 %
5-40 SYRINGE (ML) INJECTION AS NEEDED
Status: DISCONTINUED | OUTPATIENT
Start: 2020-10-28 | End: 2020-10-28 | Stop reason: HOSPADM

## 2020-10-28 RX ORDER — FLUMAZENIL 0.1 MG/ML
0.2 INJECTION INTRAVENOUS
Status: DISCONTINUED | OUTPATIENT
Start: 2020-10-28 | End: 2020-10-28 | Stop reason: HOSPADM

## 2020-10-28 RX ORDER — MIDAZOLAM HYDROCHLORIDE 1 MG/ML
.25-1 INJECTION, SOLUTION INTRAMUSCULAR; INTRAVENOUS
Status: DISCONTINUED | OUTPATIENT
Start: 2020-10-28 | End: 2020-10-28 | Stop reason: HOSPADM

## 2020-10-28 RX ORDER — SODIUM CHLORIDE 9 MG/ML
50 INJECTION, SOLUTION INTRAVENOUS CONTINUOUS
Status: DISCONTINUED | OUTPATIENT
Start: 2020-10-28 | End: 2020-10-28 | Stop reason: HOSPADM

## 2020-10-28 RX ORDER — SODIUM CHLORIDE 0.9 % (FLUSH) 0.9 %
5-40 SYRINGE (ML) INJECTION EVERY 8 HOURS
Status: DISCONTINUED | OUTPATIENT
Start: 2020-10-28 | End: 2020-10-28 | Stop reason: HOSPADM

## 2020-10-28 RX ORDER — EPINEPHRINE 0.1 MG/ML
1 INJECTION INTRACARDIAC; INTRAVENOUS
Status: DISCONTINUED | OUTPATIENT
Start: 2020-10-28 | End: 2020-10-28 | Stop reason: HOSPADM

## 2020-10-28 RX ORDER — NALOXONE HYDROCHLORIDE 0.4 MG/ML
0.4 INJECTION, SOLUTION INTRAMUSCULAR; INTRAVENOUS; SUBCUTANEOUS
Status: DISCONTINUED | OUTPATIENT
Start: 2020-10-28 | End: 2020-10-28 | Stop reason: HOSPADM

## 2020-10-28 RX ORDER — DIPHENHYDRAMINE HYDROCHLORIDE 50 MG/ML
50 INJECTION, SOLUTION INTRAMUSCULAR; INTRAVENOUS ONCE
Status: DISCONTINUED | OUTPATIENT
Start: 2020-10-28 | End: 2020-10-28 | Stop reason: HOSPADM

## 2020-10-28 RX ORDER — SODIUM CHLORIDE 9 MG/ML
INJECTION, SOLUTION INTRAVENOUS
Status: DISCONTINUED | OUTPATIENT
Start: 2020-10-28 | End: 2020-10-28 | Stop reason: HOSPADM

## 2020-10-28 RX ORDER — PROPOFOL 10 MG/ML
INJECTION, EMULSION INTRAVENOUS AS NEEDED
Status: DISCONTINUED | OUTPATIENT
Start: 2020-10-28 | End: 2020-10-28 | Stop reason: HOSPADM

## 2020-10-28 RX ORDER — FENTANYL CITRATE 50 UG/ML
25-200 INJECTION, SOLUTION INTRAMUSCULAR; INTRAVENOUS
Status: DISCONTINUED | OUTPATIENT
Start: 2020-10-28 | End: 2020-10-28 | Stop reason: HOSPADM

## 2020-10-28 RX ORDER — ATROPINE SULFATE 0.1 MG/ML
0.5 INJECTION INTRAVENOUS
Status: DISCONTINUED | OUTPATIENT
Start: 2020-10-28 | End: 2020-10-28 | Stop reason: HOSPADM

## 2020-10-28 RX ORDER — DEXTROMETHORPHAN/PSEUDOEPHED 2.5-7.5/.8
1.2 DROPS ORAL
Status: DISCONTINUED | OUTPATIENT
Start: 2020-10-28 | End: 2020-10-28 | Stop reason: HOSPADM

## 2020-10-28 RX ADMIN — PROPOFOL 100 MG: 10 INJECTION, EMULSION INTRAVENOUS at 13:11

## 2020-10-28 RX ADMIN — PROPOFOL 100 MG: 10 INJECTION, EMULSION INTRAVENOUS at 13:12

## 2020-10-28 RX ADMIN — SODIUM CHLORIDE: 900 INJECTION, SOLUTION INTRAVENOUS at 12:57

## 2020-10-28 RX ADMIN — PROPOFOL 50 MG: 10 INJECTION, EMULSION INTRAVENOUS at 13:16

## 2020-10-28 RX ADMIN — PROPOFOL 50 MG: 10 INJECTION, EMULSION INTRAVENOUS at 13:14

## 2020-10-28 NOTE — ANESTHESIA PREPROCEDURE EVALUATION
Relevant Problems   No relevant active problems       Anesthetic History   No history of anesthetic complications            Review of Systems / Medical History  Patient summary reviewed, nursing notes reviewed and pertinent labs reviewed    Pulmonary  Within defined limits                 Neuro/Psych   Within defined limits           Cardiovascular  Within defined limits                Exercise tolerance: >4 METS     GI/Hepatic/Renal  Within defined limits              Endo/Other        Obesity     Other Findings   Comments: pcos           Physical Exam    Airway  Mallampati: II  TM Distance: 4 - 6 cm  Neck ROM: normal range of motion   Mouth opening: Normal     Cardiovascular    Rhythm: regular  Rate: normal         Dental  No notable dental hx       Pulmonary  Breath sounds clear to auscultation               Abdominal  Abdominal exam normal       Other Findings            Anesthetic Plan    ASA: 2  Anesthesia type: MAC          Induction: Intravenous  Anesthetic plan and risks discussed with: Patient

## 2020-10-28 NOTE — PROCEDURES
Mahendra Aguero [facility] [address] ENEDINA Moss M.D.  [address] [place] [address]  [phone]           Flexible Sigmoidoscopy Procedure Note    NAME: Juancarlos Quinones  :  1995  MRN:  [id number]    Indications: Rectal pain    : Pete Ellis MD    Referring Provider:  Hugo Berry DO    Staff: Endoscopy Patrice Drop: Roslynn Boeck C  Endoscopy RN-1: Lala Valenzuela RN    Prosthetic devices, grafts, tissues, transplant, or devices implanted: none    Medicines:  MAC anesthesia      Procedure Details:  After informed consent was obtained with all risks and benefits of the procedure explained and preprocedure exam completed, the patient was placed in the left lateral decubitus position. Universal protocol for patient identification was performed and documented in the nursing notes. Throughout the procedure, the patient's blood pressure was monitored at least every five minutes; pulse, and oxygen saturations were monitored continuously. All vital signs were documented in the nursing notes. A digital rectal exam was performed and was normal.  The Olympus videocolonoscope  was inserted in the rectum and carefully advanced to the rectum. The colonoscope was slowly withdrawn with careful evaluation between folds. Retroflexion in the rectum was performed; findings and interventions are described below. The quality of preparation was poor-solid stool in the rectum-limited visualization       Findings:   Rectum: prep as above. Small internal hemorrhoids. No proctitis. No fissure. Interventions:   none    Specimens:   * No specimens in log *    EBL:  None. Complications:   No immediate complications     Impression:  -See post-procedure diagnoses. Recommendations:   Dicyclomine trial  If no improvement, will refer to CRS      Signed by:  Pete Ellis MD          10/28/2020  1:27 PM

## 2020-10-28 NOTE — PROGRESS NOTES

## 2020-10-28 NOTE — ROUTINE PROCESS
Arun Madera 1995 
[id number] Situation: 
Verbal report received from: Roosevelt Seymour at [facility] Procedure: Procedure(s): FLEX SIGMOIDOSCOPY   :- 
 
Background: 
 
Preoperative diagnosis: RECTAL PAIN Postoperative diagnosis: 1. Stool 2. Internal Hemorroids :  Dr. Oriana Valladares Assistant(s): Endoscopy Technician-1: Shanice Wise Endoscopy RN-1: Dawn Cherry RN Specimens: no 
H. Pylori  no Assessment: 
Intra-procedure medications Anesthesia gave intra-procedure sedation and medications, see anesthesia flow sheet Intravenous fluids: NS@ Laretta Scripture Vital signs stable Abdominal assessment: round and soft Recommendation: 
Discharge patient per MD order Family -yes Permission to share finding with family -yes

## 2020-10-28 NOTE — H&P
[facility]  [address] [place] [address]          Pre-procedure History and Physical       NAME:  Lindsey Muñoz   :   1995   MRN:   [id number]     CHIEF COMPLAINT/HPI: rectal pain    PMH:  Past Medical History:   Diagnosis Date    Acne     back    Allergy, unspecified not elsewhere classified     Txd with Immunotherapy. Dr. Divya Armando Axillary abscess 2017    Hyperglycemia     Obesity     PCOS (polycystic ovarian syndrome)     Dr. Deisy Dee. Dr. Purnima Rodriguez. PSH:  Past Surgical History:   Procedure Laterality Date    HX CYST INCISION AND DRAINAGE Left 2017    left axillary abscess, [place]    HX TYMPANOSTOMY      Rt       Allergies:  No Known Allergies    Home Medications:  Prior to Admission Medications   Prescriptions Last Dose Informant Patient Reported? Taking? OTHER Not Taking at Unknown time  Yes No   Sig: Allergy shots once a week.   hydrocortisone (ANUSOL-HC) 2.5 % rectal cream   No No   Sig: Insert  into rectum four (4) times daily. phenylephrine/Mo/shark oil/pet (PREP-HEM, PHENYLEPHRINE, RE) 10/28/2020 at Unknown time  Yes Yes   Sig: Insert  into rectum as needed.       Facility-Administered Medications: None       Hospital Medications:  Current Facility-Administered Medications   Medication Dose Route Frequency    0.9% sodium chloride infusion  50 mL/hr IntraVENous CONTINUOUS    sodium chloride (NS) flush 5-40 mL  5-40 mL IntraVENous Q8H    sodium chloride (NS) flush 5-40 mL  5-40 mL IntraVENous PRN    midazolam (VERSED) injection 0.25-10 mg  0.25-10 mg IntraVENous Multiple    fentaNYL citrate (PF) injection  mcg   mcg IntraVENous Multiple    naloxone (NARCAN) injection 0.4 mg  0.4 mg IntraVENous Multiple    flumazeniL (ROMAZICON) 0.1 mg/mL injection 0.2 mg  0.2 mg IntraVENous Multiple    benzocaine (HURRICANE) 20 % spray   Mucous Membrane ONCE    simethicone (MYLICON) 99XP/0.0XS oral drops 80 mg  1.2 mL Oral Multiple    diphenhydrAMINE (BENADRYL) injection 50 mg  50 mg IntraVENous ONCE    atropine injection 0.5 mg  0.5 mg IntraVENous ONCE PRN    EPINEPHrine (ADRENALIN) 0.1 mg/mL syringe 1 mg  1 mg Endoscopically ONCE PRN     Facility-Administered Medications Ordered in Other Encounters   Medication Dose Route Frequency    0.9% sodium chloride infusion   IntraVENous CONTINUOUS       Family History:  Family History   Problem Relation Age of Onset    Asthma Mother     High Cholesterol Mother     Diabetes Paternal Grandmother     Arthritis-osteo Maternal Grandmother     Hypertension Maternal Grandmother     Pancreatic Cancer Maternal Grandmother     Pancreatic Cancer Maternal Aunt     Hypertension Maternal Grandfather     Stroke Maternal Grandfather        Social History:  Social History     Tobacco Use    Smoking status: Passive Smoke Exposure - Never Smoker    Smokeless tobacco: Never Used    Tobacco comment: lived with smoker dad x 3 yrs   Substance Use Topics    Alcohol use: No       The patient was counseled at length about the risks of denice Covid-19 in the ralf-operative and post-operative states including the recovery window of their procedure. The patient was made aware that denice Covid-19 after a surgical procedure may worsen their prognosis for recovering from the virus and lend to a higher morbidity and or mortality risk. The patient was given the options of postponing their procedure. All of the risks, benefits, and alternatives were discussed. The patient does  wish to proceed with the procedure. PHYSICAL EXAM PRIOR TO SEDATION:  General: Alert, in no acute distress    Lungs:            CTA bilaterally  Heart:  Normal S1, S2    Abdomen: Soft, Non distended, Non tender. Normoactive bowel sounds. Assessment:   Stable for sedation administration.     Plan:     · Endoscopic procedure with sedation     Signed By: Lara Galvez MD     10/28/2020  1:11 PM

## 2020-10-28 NOTE — DISCHARGE INSTRUCTIONS
[facility] [address] Sheila Ramos M.D.  [place], [address]   [phone]          COLONOSCOPY DISCHARGE INSTRUCTIONS    Alejandro Masters  [id number]  1995    DISCOMFORT:  Redness at IV site- apply warm compress to area; if redness or soreness persist- contact your physician  There may be a slight amount of blood passed from the rectum  Gaseous discomfort- walking, belching will help relieve any discomfort  You may not operate a vehicle for 12 hours  You may not engage in an occupation involving machinery or appliances for the  rest of today  You may not drink alcoholic beverages for at least 12 hours  Avoid making any critical decisions for at least 24 hours    DIET:   You may resume your normal diet, but some patients find that heavy or large  meals may lead to indigestion or vomiting. I suggest a light meal as first food  intake. I recommend a whole food, plant-based diet for your overall health. ACTIVITY:  You may resume your normal daily activities. It is recommended that you spend the remainder of the day resting - avoid any strenuous activity. CALL ELLA Hartley ANY SIGN OF:   Increasing pain, nausea, vomiting  Abdominal distension (swelling)  Significant bleeding (oral or rectal)  Fever   Pain in chest area  Shortness of breath    Additional Instructions:   Call Dr. Sheila Ramos if any questions or problems at [phone]   Flex sig with poor bowel preparation. Small internal hemorrhoids, no obvious proctitis or fissure. Trial of dicyclomine. If no improvement, will refer to Colorectal Surgeon for evaluation. Learning About Coronavirus [phone])  Coronavirus [phone]): Overview  What is coronavirus (COVID-19)? The coronavirus disease (COVID-19) is caused by a virus. It is an illness that was first found in [place], [place], in December 2019. It has since spread worldwide. The virus can cause fever, cough, and trouble breathing.  In severe cases, it can cause pneumonia and make it hard to breathe without help. It can cause death. Coronaviruses are a large group of viruses. They cause the common cold. They also cause more serious illnesses like Middle East respiratory syndrome (MERS) and severe acute respiratory syndrome (SARS). COVID-19 is caused by a novel coronavirus. That means it's a new type that has not been seen in people before. This virus spreads person-to-person through droplets from coughing and sneezing. It can also spread when you are close to someone who is infected. And it can spread when you touch something that has the virus on it, such as a doorknob or a tabletop. What can you do to protect yourself from coronavirus (COVID-19)? The best way to protect yourself from getting sick is to:  · Avoid areas where there is an outbreak. · Avoid contact with people who may be infected. · Wash your hands often with soap or alcohol-based hand sanitizers. · Avoid crowds and try to stay at least 6 feet away from other people. · Wash your hands often, especially after you cough or sneeze. Use soap and water, and scrub for at least 20 seconds. If soap and water aren't available, use an alcohol-based hand . · Avoid touching your mouth, nose, and eyes. What can you do to avoid spreading the virus to others? To help avoid spreading the virus to others:  · Cover your mouth with a tissue when you cough or sneeze. Then throw the tissue in the trash. · Use a disinfectant to clean things that you touch often. · Stay home if you are sick or have been exposed to the virus. Don't go to school, work, or public areas. And don't use public transportation. · If you are sick:  ? Leave your home only if you need to get medical care. But call the doctor's office first so they know you're coming. And wear a face mask, if you have one.  ? If you have a face mask, wear it whenever you're around other people.  It can help stop the spread of the virus when you cough or sneeze. ? Clean and disinfect your home every day. Use household  and disinfectant wipes or sprays. Take special care to clean things that you grab with your hands. These include doorknobs, remote controls, phones, and handles on your refrigerator and microwave. And don't forget countertops, tabletops, bathrooms, and computer keyboards. When to call for help  Call 911 anytime you think you may need emergency care. For example, call if:  · You have severe trouble breathing. (You can't talk at all.)  · You have constant chest pain or pressure. · You are severely dizzy or lightheaded. · You are confused or can't think clearly. · Your face and lips have a blue color. · You pass out (lose consciousness) or are very hard to wake up. Call your doctor now if you develop symptoms such as:  · Shortness of breath. · Fever. · Cough. If you need to get care, call ahead to the doctor's office for instructions before you go. Make sure you wear a face mask, if you have one, to prevent exposing other people to the virus. Where can you get the latest information? The following health organizations are tracking and studying this virus. Their websites contain the most up-to-date information. Shi Pinto also learn what to do if you think you may have been exposed to the virus. · U.S. Centers for Disease Control and Prevention (CDC): The CDC provides updated news about the disease and travel advice. The website also tells you how to prevent the spread of infection. www.cdc.gov  · World Health Organization [facility]): WHO offers information about the virus outbreaks. WHO also has travel advice. www.who.int  Current as of: April 1, 2020               Content Version: 12.4  © 2997-3735 [employer].    Care instructions adapted under license by your healthcare professional. If you have questions about a medical condition or this instruction, always ask your healthcare professional. Khariägen Sara any warranty or liability for your use of this information.

## 2020-10-28 NOTE — ANESTHESIA POSTPROCEDURE EVALUATION
Post-Anesthesia Evaluation and Assessment    Patient: Alexandr Lee MRN: [id number]  SSN: [id number]    YOB: 1995  Age: 31 y.o. Sex: female      I have evaluated the patient and they are stable and ready for discharge from the PACU. Cardiovascular Function/Vital Signs  Visit Vitals  BP (!) 98/47   Pulse 74   Temp 37.3 °C (99.1 °F)   Resp 22   Ht 5' 7\" (1.702 m)   Wt 106.6 kg (235 lb)   SpO2 100%   Breastfeeding No   BMI 36.81 kg/m²       Patient is status post MAC anesthesia for Procedure(s): FLEX SIGMOIDOSCOPY   :-.    Nausea/Vomiting: None    Postoperative hydration reviewed and adequate. Pain:  Pain Scale 1: Numeric (0 - 10) (10/28/20 1201)  Pain Intensity 1: 1 (10/28/20 1201)   Managed    Neurological Status: At baseline    Mental Status, Level of Consciousness: Alert and  oriented to person, place, and time    Pulmonary Status:   O2 Device: CO2 nasal cannula (10/28/20 1321)   Adequate oxygenation and airway patent    Complications related to anesthesia: None    Post-anesthesia assessment completed.  No concerns    Signed By: Tigist Apple MD     October 28, 2020

## 2022-03-19 PROBLEM — L02.419 AXILLARY ABSCESS: Status: ACTIVE | Noted: 2017-01-02

## 2025-08-20 ENCOUNTER — TELEPHONE (OUTPATIENT)
Age: 30
End: 2025-08-20

## (undated) DEVICE — Z INACTIVE NO USAGE TURNOVER KIT RM CLEANOP

## (undated) DEVICE — TRAY PREP DRY W/ PREM GLV 2 APPL 6 SPNG 2 UNDPD 1 OVERWRAP

## (undated) DEVICE — BASIC PACK: Brand: CONVERTORS

## (undated) DEVICE — TOWEL SURG W17XL27IN STD BLU COT NONFENESTRATED PREWASHED

## (undated) DEVICE — REM POLYHESIVE ADULT PATIENT RETURN ELECTRODE: Brand: VALLEYLAB

## (undated) DEVICE — MEDI-VAC NON-CONDUCTIVE SUCTION TUBING: Brand: CARDINAL HEALTH

## (undated) DEVICE — SOLUTION IV 1000ML 0.9% SOD CHL

## (undated) DEVICE — MEDI-VAC YANK SUCT HNDL W/TPRD BULBOUS TIP: Brand: CARDINAL HEALTH

## (undated) DEVICE — DRAPE,LAPAROTOMY,T,PEDI,STERILE: Brand: MEDLINE

## (undated) DEVICE — ROCKER SWITCH PENCIL BLADE ELECTRODE, HOLSTER: Brand: EDGE

## (undated) DEVICE — SURGICAL PROCEDURE PACK BASIN MAJ SET CUST NO CAUT

## (undated) DEVICE — DEVON™ KNEE AND BODY STRAP 60" X 3" (1.5 M X 7.6 CM): Brand: DEVON

## (undated) DEVICE — GAUZE SPONGES,12 PLY: Brand: CURITY

## (undated) DEVICE — STERILE POLYISOPRENE POWDER-FREE SURGICAL GLOVES: Brand: PROTEXIS

## (undated) DEVICE — 1200 GUARD II KIT W/5MM TUBE W/O VAC TUBE: Brand: GUARDIAN

## (undated) DEVICE — LIGHT HANDLE: Brand: DEVON